# Patient Record
Sex: FEMALE | Race: OTHER | NOT HISPANIC OR LATINO | URBAN - METROPOLITAN AREA
[De-identification: names, ages, dates, MRNs, and addresses within clinical notes are randomized per-mention and may not be internally consistent; named-entity substitution may affect disease eponyms.]

---

## 2017-02-22 ENCOUNTER — OUTPATIENT (OUTPATIENT)
Dept: OUTPATIENT SERVICES | Facility: HOSPITAL | Age: 42
LOS: 1 days | End: 2017-02-22

## 2017-02-22 VITALS
WEIGHT: 110.89 LBS | HEIGHT: 56 IN | HEART RATE: 60 BPM | RESPIRATION RATE: 14 BRPM | DIASTOLIC BLOOD PRESSURE: 70 MMHG | TEMPERATURE: 98 F | SYSTOLIC BLOOD PRESSURE: 110 MMHG

## 2017-02-22 DIAGNOSIS — N63 UNSPECIFIED LUMP IN BREAST: ICD-10-CM

## 2017-02-22 DIAGNOSIS — Z98.890 OTHER SPECIFIED POSTPROCEDURAL STATES: Chronic | ICD-10-CM

## 2017-02-22 DIAGNOSIS — Z98.891 HISTORY OF UTERINE SCAR FROM PREVIOUS SURGERY: Chronic | ICD-10-CM

## 2017-02-22 LAB
HCG SERPL-ACNC: < 5 MIU/ML — SIGNIFICANT CHANGE UP
HCT VFR BLD CALC: 39.3 % — SIGNIFICANT CHANGE UP (ref 34.5–45)
HGB BLD-MCNC: 12.5 G/DL — SIGNIFICANT CHANGE UP (ref 11.5–15.5)
MCHC RBC-ENTMCNC: 27.7 PG — SIGNIFICANT CHANGE UP (ref 27–34)
MCHC RBC-ENTMCNC: 31.8 % — LOW (ref 32–36)
MCV RBC AUTO: 86.9 FL — SIGNIFICANT CHANGE UP (ref 80–100)
PLATELET # BLD AUTO: 294 K/UL — SIGNIFICANT CHANGE UP (ref 150–400)
PMV BLD: 11.3 FL — SIGNIFICANT CHANGE UP (ref 7–13)
RBC # BLD: 4.52 M/UL — SIGNIFICANT CHANGE UP (ref 3.8–5.2)
RBC # FLD: 13.9 % — SIGNIFICANT CHANGE UP (ref 10.3–14.5)
WBC # BLD: 8.51 K/UL — SIGNIFICANT CHANGE UP (ref 3.8–10.5)
WBC # FLD AUTO: 8.51 K/UL — SIGNIFICANT CHANGE UP (ref 3.8–10.5)

## 2017-02-22 NOTE — H&P PST ADULT - NSANTHOSAYNRD_GEN_A_CORE
No. AKANKSHA screening performed.  STOP BANG Legend: 0-2 = LOW Risk; 3-4 = INTERMEDIATE Risk; 5-8 = HIGH Risk

## 2017-02-22 NOTE — H&P PST ADULT - NEGATIVE NEUROLOGICAL SYMPTOMS
no difficulty walking/no generalized seizures/no paresthesias/no syncope/no weakness/no focal seizures/no headache/no tremors
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
## 2017-02-22 NOTE — H&P PST ADULT - PROBLEM SELECTOR PLAN 1
Pt. is scheduled for excision of right breast mass on 2/28/17.  Preoperative instructions reviewed, pt verbalized understanding.  Preop Famotidine and Chlorhexidine provided.  Lab results pending

## 2017-02-22 NOTE — H&P PST ADULT - FAMILY HISTORY
Sibling  Still living? No  Family history of breast cancer, Age at diagnosis: Age Unknown     Mother  Still living? Yes, Estimated age: 71-80  Family history of hypertension, Age at diagnosis: Age Unknown Sibling  Still living? No  Family history of breast cancer, Age at diagnosis: Age Unknown     Mother  Still living? Yes, Estimated age: 71-80  Family history of hypertension, Age at diagnosis: Age Unknown     Grandparent  Still living? No  Family history of breast cancer, Age at diagnosis: Age Unknown

## 2017-02-22 NOTE — H&P PST ADULT - HISTORY OF PRESENT ILLNESS
40 y/o female with a breast mass presents to PAST today for presurgical evaulation.  she noticed the lump 2 years ago and had a biopsy which was benign.  she complained that the lump increased in size.  Mammogram sonogram and bx were done.  Bx was benign , but to have it removed 40 y/o female with a breast lump presents to PAST today for presurgical evaulation.  She has positive family history of breast cancer, maternal grandmother and sister  from breast cancer.  She noticed the right breast lump approximately 2 years ago and had a biopsy which was benign.  Recently she noticed that the lump had grown in size.  Mammogram, sonogram and biopsy was performed.  Biopsy result was benign.  She is scheduled for excision of the right breast mass on 17.
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
## 2017-02-27 ENCOUNTER — TRANSCRIPTION ENCOUNTER (OUTPATIENT)
Age: 42
End: 2017-02-27

## 2017-02-28 ENCOUNTER — OUTPATIENT (OUTPATIENT)
Dept: OUTPATIENT SERVICES | Facility: HOSPITAL | Age: 42
LOS: 1 days | Discharge: HOME | End: 2017-02-28
Payer: COMMERCIAL

## 2017-02-28 VITALS
SYSTOLIC BLOOD PRESSURE: 96 MMHG | HEART RATE: 67 BPM | DIASTOLIC BLOOD PRESSURE: 54 MMHG | TEMPERATURE: 98 F | OXYGEN SATURATION: 100 % | RESPIRATION RATE: 15 BRPM

## 2017-02-28 VITALS
TEMPERATURE: 98 F | HEIGHT: 56 IN | SYSTOLIC BLOOD PRESSURE: 111 MMHG | HEART RATE: 65 BPM | OXYGEN SATURATION: 100 % | DIASTOLIC BLOOD PRESSURE: 68 MMHG | WEIGHT: 110.01 LBS | RESPIRATION RATE: 14 BRPM

## 2017-02-28 DIAGNOSIS — Z98.890 OTHER SPECIFIED POSTPROCEDURAL STATES: Chronic | ICD-10-CM

## 2017-02-28 DIAGNOSIS — N63 UNSPECIFIED LUMP IN BREAST: ICD-10-CM

## 2017-02-28 DIAGNOSIS — Z98.891 HISTORY OF UTERINE SCAR FROM PREVIOUS SURGERY: Chronic | ICD-10-CM

## 2017-02-28 PROCEDURE — 88305 TISSUE EXAM BY PATHOLOGIST: CPT | Mod: 26

## 2017-02-28 RX ORDER — ACETAMINOPHEN 500 MG
2 TABLET ORAL
Qty: 0 | Refills: 0 | COMMUNITY
Start: 2017-02-28

## 2017-02-28 RX ORDER — IBUPROFEN 200 MG
1 TABLET ORAL
Qty: 0 | Refills: 0 | COMMUNITY
Start: 2017-02-28

## 2017-02-28 RX ORDER — SODIUM CHLORIDE 9 MG/ML
1000 INJECTION, SOLUTION INTRAVENOUS
Qty: 0 | Refills: 0 | Status: DISCONTINUED | OUTPATIENT
Start: 2017-02-28 | End: 2017-03-01

## 2017-02-28 RX ORDER — IBUPROFEN 200 MG
400 TABLET ORAL EVERY 6 HOURS
Qty: 0 | Refills: 0 | Status: DISCONTINUED | OUTPATIENT
Start: 2017-02-28 | End: 2017-03-01

## 2017-02-28 RX ORDER — ACETAMINOPHEN 500 MG
650 TABLET ORAL EVERY 6 HOURS
Qty: 0 | Refills: 0 | Status: DISCONTINUED | OUTPATIENT
Start: 2017-02-28 | End: 2017-03-01

## 2017-02-28 NOTE — ASU DISCHARGE PLAN (ADULT/PEDIATRIC). - NOTIFY
Fever greater than 101/Bleeding that does not stop/Pain not relieved by Medications/Persistent Nausea and Vomiting

## 2017-02-28 NOTE — ASU DISCHARGE PLAN (ADULT/PEDIATRIC). - ITEMS TO FOLLOWUP WITH YOUR PHYSICIAN'S
Please follow up with Dr. Dixon in his office in 2-3 weeks.  Please call his office to schedule an appointment.

## 2017-02-28 NOTE — ASU DISCHARGE PLAN (ADULT/PEDIATRIC). - MEDICATION SUMMARY - MEDICATIONS TO TAKE
I will START or STAY ON the medications listed below when I get home from the hospital:    vitamin C 1000 mg orally daily  --     -- Indication: For Home Medication    Vitamin B Complex  1 tablet orally daily  --     -- Indication: For Home Medication    Calcium 600 mg orally daily  --     -- Indication: For Home Medication    ibuprofen 400 mg oral tablet  -- 1 tab(s) by mouth every 6 hours, As needed, moderate pain  -- Indication: For Pain Control    acetaminophen 325 mg oral tablet  -- 2 tab(s) by mouth every 6 hours, As needed, Mild Pain (1 - 3)  -- Indication: For Pain Control

## 2017-03-02 LAB — SURGICAL PATHOLOGY STUDY: SIGNIFICANT CHANGE UP

## 2017-04-06 ENCOUNTER — TRANSCRIPTION ENCOUNTER (OUTPATIENT)
Age: 42
End: 2017-04-06

## 2017-04-06 ENCOUNTER — OUTPATIENT (OUTPATIENT)
Dept: OUTPATIENT SERVICES | Facility: HOSPITAL | Age: 42
LOS: 1 days | End: 2017-04-06

## 2017-04-06 VITALS
WEIGHT: 106.04 LBS | HEART RATE: 79 BPM | DIASTOLIC BLOOD PRESSURE: 82 MMHG | RESPIRATION RATE: 16 BRPM | HEIGHT: 56.5 IN | SYSTOLIC BLOOD PRESSURE: 118 MMHG | TEMPERATURE: 97 F

## 2017-04-06 DIAGNOSIS — Z98.891 HISTORY OF UTERINE SCAR FROM PREVIOUS SURGERY: Chronic | ICD-10-CM

## 2017-04-06 DIAGNOSIS — Z98.890 OTHER SPECIFIED POSTPROCEDURAL STATES: Chronic | ICD-10-CM

## 2017-04-06 DIAGNOSIS — N64.52 NIPPLE DISCHARGE: ICD-10-CM

## 2017-04-06 LAB
HCG SERPL-ACNC: < 5 MIU/ML — SIGNIFICANT CHANGE UP
HCT VFR BLD CALC: 39.2 % — SIGNIFICANT CHANGE UP (ref 34.5–45)
HGB BLD-MCNC: 12.6 G/DL — SIGNIFICANT CHANGE UP (ref 11.5–15.5)
MCHC RBC-ENTMCNC: 27.7 PG — SIGNIFICANT CHANGE UP (ref 27–34)
MCHC RBC-ENTMCNC: 32.1 % — SIGNIFICANT CHANGE UP (ref 32–36)
MCV RBC AUTO: 86.2 FL — SIGNIFICANT CHANGE UP (ref 80–100)
PLATELET # BLD AUTO: 297 K/UL — SIGNIFICANT CHANGE UP (ref 150–400)
PMV BLD: 10.5 FL — SIGNIFICANT CHANGE UP (ref 7–13)
RBC # BLD: 4.55 M/UL — SIGNIFICANT CHANGE UP (ref 3.8–5.2)
RBC # FLD: 14.5 % — SIGNIFICANT CHANGE UP (ref 10.3–14.5)
WBC # BLD: 7.23 K/UL — SIGNIFICANT CHANGE UP (ref 3.8–10.5)
WBC # FLD AUTO: 7.23 K/UL — SIGNIFICANT CHANGE UP (ref 3.8–10.5)

## 2017-04-06 NOTE — H&P PST ADULT - FAMILY HISTORY
Sibling  Still living? No  Family history of breast cancer, Age at diagnosis: Age Unknown     Mother  Still living? Yes, Estimated age: 71-80  Family history of hypertension, Age at diagnosis: Age Unknown     Grandparent  Still living? No  Family history of breast cancer, Age at diagnosis: Age Unknown

## 2017-04-06 NOTE — H&P PST ADULT - PROBLEM SELECTOR PLAN 1
Pt. is scheduled for excision of right breast mass on 2/28/17.  Preoperative instructions reviewed, pt verbalized understanding.  Preop Famotidine and Chlorhexidine provided.  Lab results pending scheduled right breast micro ductotomy possible terminal duct excision right breast on 4/7/17    Preoperative instructions reviewed, pt verbalized understanding.  Preop Famotidine and Chlorhexidine provided.  cbc, hcg results pending  case discussed with Dr Banerjee

## 2017-04-06 NOTE — H&P PST ADULT - NEGATIVE NEUROLOGICAL SYMPTOMS
no difficulty walking/no focal seizures/no weakness/no headache/no tremors/no syncope/no paresthesias/no generalized seizures
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
## 2017-04-06 NOTE — H&P PST ADULT - HISTORY OF PRESENT ILLNESS
40 y/o female with a breast lump presents to PAST today for presurgical evaulation.  She has positive family history of breast cancer, maternal grandmother and sister  from breast cancer.  She noticed the right breast lump approximately 2 years ago and had a biopsy which was benign.  Recently she noticed that the lump had grown in size.  Mammogram, sonogram and biopsy was performed.  Biopsy result was benign.  She is scheduled for excision of the right breast mass on 17. 42 y/o female present to PST today for presurgical evaluation for scheduled right breast micro ductotomy possible terminal duct excision right breast on 17    She has positive family history of breast cancer, maternal grandmother and sister  from breast cancer.  She noticed the right breast lump approximately 2 years ago and had a biopsy which was benign.  Recently she noticed that the lump had grown in size.  Mammogram, sonogram and biopsy was performed.  Biopsy result was benign and is s/p right breast mass on 17.  Per pt two weeks post op had bloody discharge form right nipple, sonogram done and was told need to remove duct in right breast.
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
## 2017-04-06 NOTE — H&P PST ADULT - LYMPHATIC
posterior cervical R/posterior cervical L/anterior cervical L/supraclavicular R/supraclavicular L/anterior cervical R

## 2017-04-07 ENCOUNTER — OUTPATIENT (OUTPATIENT)
Dept: OUTPATIENT SERVICES | Facility: HOSPITAL | Age: 42
LOS: 1 days | Discharge: HOME | End: 2017-04-07
Payer: COMMERCIAL

## 2017-04-07 VITALS
HEIGHT: 56 IN | TEMPERATURE: 98 F | DIASTOLIC BLOOD PRESSURE: 64 MMHG | SYSTOLIC BLOOD PRESSURE: 109 MMHG | WEIGHT: 106.04 LBS | RESPIRATION RATE: 16 BRPM | HEART RATE: 64 BPM

## 2017-04-07 VITALS
OXYGEN SATURATION: 99 % | SYSTOLIC BLOOD PRESSURE: 100 MMHG | HEART RATE: 70 BPM | DIASTOLIC BLOOD PRESSURE: 70 MMHG | RESPIRATION RATE: 12 BRPM

## 2017-04-07 DIAGNOSIS — Z98.891 HISTORY OF UTERINE SCAR FROM PREVIOUS SURGERY: Chronic | ICD-10-CM

## 2017-04-07 DIAGNOSIS — Z98.890 OTHER SPECIFIED POSTPROCEDURAL STATES: Chronic | ICD-10-CM

## 2017-04-07 DIAGNOSIS — N64.52 NIPPLE DISCHARGE: ICD-10-CM

## 2017-04-07 PROCEDURE — 88305 TISSUE EXAM BY PATHOLOGIST: CPT | Mod: 26

## 2017-04-07 RX ORDER — SODIUM CHLORIDE 9 MG/ML
1000 INJECTION, SOLUTION INTRAVENOUS
Qty: 0 | Refills: 0 | Status: DISCONTINUED | OUTPATIENT
Start: 2017-04-07 | End: 2017-04-08

## 2017-04-07 NOTE — ASU DISCHARGE PLAN (ADULT/PEDIATRIC). - NURSING INSTRUCTIONS
You reiceved Tylenol in the OR at 1:00 pm, your next dose of tyelnol ( if needed) will be in 6 hrs at 7:00 pm

## 2017-04-07 NOTE — ASU DISCHARGE PLAN (ADULT/PEDIATRIC). - ACTIVITY LEVEL
No heavy lifting or strenuous activity until follow-up appointment.  Otherwise, you may resume your previous level of activity as tolerated.

## 2017-04-07 NOTE — ASU DISCHARGE PLAN (ADULT/PEDIATRIC). - MEDICATION SUMMARY - MEDICATIONS TO TAKE
I will START or STAY ON the medications listed below when I get home from the hospital:    Percocet 5/325 oral tablet  -- 1 tab(s) by mouth every 6 hours, As Needed for severe pain MDD:4 tabs  -- Caution federal law prohibits the transfer of this drug to any person other  than the person for whom it was prescribed.  May cause drowsiness.  Alcohol may intensify this effect.  Use care when operating dangerous machinery.  This prescription cannot be refilled.  This product contains acetaminophen.  Do not use  with any other product containing acetaminophen to prevent possible liver damage.  Using more of this medication than prescribed may cause serious breathing problems.    -- Indication: For Post-op Pain

## 2017-04-07 NOTE — ASU DISCHARGE PLAN (ADULT/PEDIATRIC). - SPECIAL INSTRUCTIONS
Please follow-up with Dr. Dixon in 1 week.  Please call his office at [phone] to make an appointment.

## 2017-04-07 NOTE — ASU DISCHARGE PLAN (ADULT/PEDIATRIC). - NOTIFY
Persistent Nausea and Vomiting/Swelling that continues/Inability to Tolerate Liquids or Foods/Bleeding that does not stop/Unable to Urinate/Fever greater than 101

## 2017-04-11 DIAGNOSIS — N64.52 NIPPLE DISCHARGE: ICD-10-CM

## 2017-04-14 LAB — SURGICAL PATHOLOGY STUDY: SIGNIFICANT CHANGE UP

## 2020-02-16 NOTE — H&P PST ADULT - NS PRO PASSIVE SMOKE EXP
Mother will breastfeed baby 8 or more times in 24 hours on baby's cue until content. Ensure a deep latch at each feeding that feels like a pull and tug. Latch should not be painful but may be tender. Observe for signs of milk transfer such as audible swallows and chin pauses during feeding. Mother should keep baby active at the breast by using compression of breast and stimulating baby throughout feeding.  Mother should use her feeding log to keep track of baby's intake and output. Mother should avoid bottles and pacifiers. Call for asst as needed.    
Pt to follow basic breastfeeding education.  
Pt to follow basic breastfeeding education.  
No

## 2020-10-19 PROBLEM — N63 UNSPECIFIED LUMP IN BREAST: Chronic | Status: ACTIVE | Noted: 2017-02-22

## 2020-10-19 PROBLEM — D25.9 LEIOMYOMA OF UTERUS, UNSPECIFIED: Chronic | Status: ACTIVE | Noted: 2017-02-22

## 2020-10-19 PROBLEM — T30.0 BURN OF UNSPECIFIED BODY REGION, UNSPECIFIED DEGREE: Chronic | Status: ACTIVE | Noted: 2017-02-22

## 2020-10-19 PROBLEM — S05.91XA UNSPECIFIED INJURY OF RIGHT EYE AND ORBIT, INITIAL ENCOUNTER: Chronic | Status: ACTIVE | Noted: 2017-02-22

## 2020-10-22 ENCOUNTER — APPOINTMENT (OUTPATIENT)
Dept: OBGYN | Facility: CLINIC | Age: 45
End: 2020-10-22

## 2020-10-23 ENCOUNTER — RESULT REVIEW (OUTPATIENT)
Age: 45
End: 2020-10-23

## 2020-10-23 ENCOUNTER — APPOINTMENT (OUTPATIENT)
Dept: OBGYN | Facility: CLINIC | Age: 45
End: 2020-10-23
Payer: COMMERCIAL

## 2020-10-23 PROCEDURE — 99386 PREV VISIT NEW AGE 40-64: CPT

## 2020-10-23 PROCEDURE — 99072 ADDL SUPL MATRL&STAF TM PHE: CPT

## 2020-12-08 NOTE — H&P PST ADULT - ALCOHOL USE HISTORY SINGLE SELECT
[FreeTextEntry1] : Ms Jasso is a 64 year old female with PMHx AT s/p failed ablation (not performed in 2015), positive tilt table/orthostatic hypotension, VSD, non-obstructive CAD, PVCs, chronic palpitations, renal donor (s/p L nephrectomy for son) admitted with palpitations rom PST, and SOB at rest.\par -CTA chest with extensive pulmonary embolism. Suggestion right heart strain. Right upper lobe 6 mm nodule is indeterminate.\par LE duplex Acute, non-occlusive deep vein thromboses noted in the right peroneal and posterior tibial veins.\par \par -S/p hep gtt. Started on Xarelto \par -echo with Dilated RV and Decreased RV function, RV normal on MRI on 9/23/2020 \par \par Patient complaints of low back pain, not taking pain meds, pain likely immobility. Denies fevers, night sweats or weight loss. \par \par Referred for hypercoagulable work up. \par \par I had a detailed discussion today with the patient regarding the natural history, epidemiology, diagnosis, and treatment of  hypercoagulable state. I reviewed her radiological studies in detail today. I then discussed treatment with Xarelto 20 mg daily for at least 1 year.  Hypercoagulable work up was done to determine length of anticoagulation. I reviewed with patient the benefits versus risks of therapy. I answered all her  questions to satisfaction.\par \par Greater than 50% of the encounter time was spent on counseling and coordination of care for  hypercoagulable state    and I have spent  45   minutes of face to face time with the patient.\par \par RTC 4 months. \par 
never

## 2021-01-25 PROBLEM — Z00.00 ENCOUNTER FOR PREVENTIVE HEALTH EXAMINATION: Status: ACTIVE | Noted: 2021-01-25

## 2021-10-28 ENCOUNTER — APPOINTMENT (OUTPATIENT)
Dept: OBGYN | Facility: CLINIC | Age: 46
End: 2021-10-28
Payer: COMMERCIAL

## 2021-10-28 VITALS — DIASTOLIC BLOOD PRESSURE: 85 MMHG | SYSTOLIC BLOOD PRESSURE: 125 MMHG | WEIGHT: 119 LBS

## 2021-10-28 DIAGNOSIS — Z80.3 FAMILY HISTORY OF MALIGNANT NEOPLASM OF BREAST: ICD-10-CM

## 2021-10-28 DIAGNOSIS — Z82.49 FAMILY HISTORY OF ISCHEMIC HEART DISEASE AND OTHER DISEASES OF THE CIRCULATORY SYSTEM: ICD-10-CM

## 2021-10-28 DIAGNOSIS — Z86.018 PERSONAL HISTORY OF OTHER BENIGN NEOPLASM: ICD-10-CM

## 2021-10-28 DIAGNOSIS — Z86.39 PERSONAL HISTORY OF OTHER ENDOCRINE, NUTRITIONAL AND METABOLIC DISEASE: ICD-10-CM

## 2021-10-28 DIAGNOSIS — R03.0 ELEVATED BLOOD-PRESSURE READING, W/OUT DIAGNOSIS OF HYPERTENSION: ICD-10-CM

## 2021-10-28 DIAGNOSIS — Z80.49 FAMILY HISTORY OF MALIGNANT NEOPLASM OF OTHER GENITAL ORGANS: ICD-10-CM

## 2021-10-28 PROCEDURE — 99396 PREV VISIT EST AGE 40-64: CPT

## 2021-11-01 LAB
C TRACH RRNA SPEC QL NAA+PROBE: NOT DETECTED
HPV 16 E6+E7 MRNA CVX QL NAA+PROBE: NOT DETECTED
HPV HIGH+LOW RISK DNA PNL CVX: NOT DETECTED
HPV18+45 E6+E7 MRNA CVX QL NAA+PROBE: NOT DETECTED
N GONORRHOEA RRNA SPEC QL NAA+PROBE: NOT DETECTED
SOURCE AMPLIFICATION: NORMAL

## 2021-11-04 LAB — CYTOLOGY CVX/VAG DOC THIN PREP: NORMAL

## 2021-11-11 ENCOUNTER — APPOINTMENT (OUTPATIENT)
Dept: ANTEPARTUM | Facility: CLINIC | Age: 46
End: 2021-11-11
Payer: COMMERCIAL

## 2021-11-11 ENCOUNTER — APPOINTMENT (OUTPATIENT)
Dept: OBGYN | Facility: CLINIC | Age: 46
End: 2021-11-11
Payer: COMMERCIAL

## 2021-11-11 VITALS — SYSTOLIC BLOOD PRESSURE: 132 MMHG | DIASTOLIC BLOOD PRESSURE: 81 MMHG | WEIGHT: 120 LBS

## 2021-11-11 DIAGNOSIS — N84.0 POLYP OF CORPUS UTERI: ICD-10-CM

## 2021-11-11 PROCEDURE — 76857 US EXAM PELVIC LIMITED: CPT

## 2021-11-11 PROCEDURE — ZZZZZ: CPT

## 2021-11-11 PROCEDURE — 76830 TRANSVAGINAL US NON-OB: CPT

## 2022-01-06 ENCOUNTER — APPOINTMENT (OUTPATIENT)
Dept: OBGYN | Facility: CLINIC | Age: 47
End: 2022-01-06

## 2022-01-06 ENCOUNTER — APPOINTMENT (OUTPATIENT)
Dept: ANTEPARTUM | Facility: CLINIC | Age: 47
End: 2022-01-06

## 2022-01-30 NOTE — COUNSELING
[Breast Self Exam] : breast self exam [Contraception/ Emergency Contraception/ Safe Sexual Practices] : contraception, emergency contraception, safe sexual practices [Vaccines] : vaccines

## 2022-01-30 NOTE — PLAN
[FreeTextEntry1] : 46 year old P1 presenting for annual exam.\par -uterus is irregular, about 16-18 weeks by size\par -unofficial ultrasound today consistent with many large fibroids\par -patient with normal menstrual bleeding which is reassuring\par -given patient is close to menopause, anticipate these fibroids will soon shrink in size\par -however if they start to grow rapidly, cause AUB, or cause the patient pain, plan for hysterectomy\par -f/u PAP and GC/CT done today\par -contraception: condoms\par -f/u for official GYN ultrasound, and then repeat GYN ultrasound in another 6 months\par \par =============================================\par I, Jany Betancourt, acted solely as a scribe for Dr. Ward Augustin on Oct 28 2021  2:00PM. All medical entries made by the Scribe were at my, Dr. Ward Augustin's, direction and personally dictated by me on Oct 28 2021  2:00PM . I have reviewed the chart and agree that the record accurately reflects my personal performance of the history, physical exam, assessment, and plan. I have also personally directed, reviewed, and agreed with the chart.\par =============================================

## 2022-01-30 NOTE — PHYSICAL EXAM
[Appropriately responsive] : appropriately responsive [Alert] : alert [No Acute Distress] : no acute distress [No Lymphadenopathy] : no lymphadenopathy [Regular Rate Rhythm] : regular rate rhythm [No Murmurs] : no murmurs [Clear to Auscultation B/L] : clear to auscultation bilaterally [Soft] : soft [Non-tender] : non-tender [Non-distended] : non-distended [No HSM] : No HSM [No Lesions] : no lesions [No Mass] : no mass [Oriented x3] : oriented x3 [Examination Of The Breasts] : a normal appearance [No Masses] : no breast masses were palpable [Labia Majora] : normal [Labia Minora] : normal [Normal] : normal [Enlarged ___ wks] : enlarged [unfilled] ~Uweeks [Uterine Adnexae] : normal [Declined] : Patient declined rectal exam [FreeTextEntry6] : Irregular

## 2022-01-30 NOTE — HISTORY OF PRESENT ILLNESS
[N] : Patient reports normal menses [Y] : Positive pregnancy history [Patient reported PAP Smear was normal] : Patient reported PAP Smear was normal [Gonorrhea test offered] : Gonorrhea test offered [Chlamydia test offered] : Chlamydia test offered [HPV test offered] : HPV test offered [Monogamous] : is monogamous [Currently Active] : currently active [Men] : men [No] : No [Yes] : Yes [Condoms] : Condoms [Patient refuses STI testing] : Patient refuses STI testing [Mammogramdate] : 10/21 [TextBox_19] : patient had mammogram today, results pending [PapSmeardate] : 12/20 [LMPDate] : 10/22/21 [MensesFreq] : 28 [MensesAmount] : normal [PGxTotal] : 1 [[facility] Center[facility]] : 1 [PGHxPremature] : 0 [PGHxAbortions] : 0 [[facility] Healthcareiving] : 1 [FreeTextEntry1] :  x 1

## 2022-06-23 ENCOUNTER — APPOINTMENT (OUTPATIENT)
Dept: OBGYN | Facility: CLINIC | Age: 47
End: 2022-06-23

## 2022-06-23 ENCOUNTER — APPOINTMENT (OUTPATIENT)
Dept: ANTEPARTUM | Facility: CLINIC | Age: 47
End: 2022-06-23

## 2022-06-23 VITALS — WEIGHT: 118 LBS | DIASTOLIC BLOOD PRESSURE: 88 MMHG | SYSTOLIC BLOOD PRESSURE: 133 MMHG

## 2022-06-27 ENCOUNTER — APPOINTMENT (OUTPATIENT)
Dept: OBGYN | Facility: CLINIC | Age: 47
End: 2022-06-27

## 2022-06-27 ENCOUNTER — APPOINTMENT (OUTPATIENT)
Dept: ANTEPARTUM | Facility: CLINIC | Age: 47
End: 2022-06-27

## 2022-06-27 VITALS
DIASTOLIC BLOOD PRESSURE: 85 MMHG | WEIGHT: 117 LBS | SYSTOLIC BLOOD PRESSURE: 121 MMHG | HEIGHT: 60 IN | BODY MASS INDEX: 22.97 KG/M2

## 2022-06-27 PROCEDURE — 99242 OFF/OP CONSLTJ NEW/EST SF 20: CPT

## 2022-06-27 NOTE — PHYSICAL EXAM

## 2022-06-27 NOTE — PLAN
[FreeTextEntry1] : -Discussed with patient she can choose to go for a second opinion, if not she should speak to  and start chemo therapy as soon as possible. \par -RTO October for annual

## 2022-06-27 NOTE — HISTORY OF PRESENT ILLNESS
[Patient reported PAP Smear was normal] : Patient reported PAP Smear was normal [FreeTextEntry1] : Patient is a 46 year old  presenting for a consultation. LMP 6/5/22. She was dx with breast cancer after going for routine mammo, MRI done on 6/7/22. She had a biopsy done as she had a lump which she thought was non-benign. She sees surgical oncologist Dr. Han and . She has a fam hx of breast cancer. She was also told the cancer has spread to her lymph nodes. She states she will start chemo therapy as soon as possible. \par \par On exam today, uterus is enlarged measuring 14 weeks. Multiple myomas noted as last time measuring 5cm.  [PapSmeardate] : 10/28/21

## 2022-06-28 ENCOUNTER — OUTPATIENT (OUTPATIENT)
Dept: OUTPATIENT SERVICES | Facility: HOSPITAL | Age: 47
LOS: 1 days | End: 2022-06-28

## 2022-06-28 VITALS
SYSTOLIC BLOOD PRESSURE: 123 MMHG | RESPIRATION RATE: 16 BRPM | HEIGHT: 57 IN | DIASTOLIC BLOOD PRESSURE: 84 MMHG | HEART RATE: 78 BPM | TEMPERATURE: 98 F | WEIGHT: 115.08 LBS | OXYGEN SATURATION: 98 %

## 2022-06-28 DIAGNOSIS — E03.9 HYPOTHYROIDISM, UNSPECIFIED: ICD-10-CM

## 2022-06-28 DIAGNOSIS — I10 ESSENTIAL (PRIMARY) HYPERTENSION: ICD-10-CM

## 2022-06-28 DIAGNOSIS — Z98.890 OTHER SPECIFIED POSTPROCEDURAL STATES: Chronic | ICD-10-CM

## 2022-06-28 DIAGNOSIS — C50.911 MALIGNANT NEOPLASM OF UNSPECIFIED SITE OF RIGHT FEMALE BREAST: ICD-10-CM

## 2022-06-28 DIAGNOSIS — Z98.891 HISTORY OF UTERINE SCAR FROM PREVIOUS SURGERY: Chronic | ICD-10-CM

## 2022-06-28 LAB
ALBUMIN SERPL ELPH-MCNC: 3.9 G/DL — SIGNIFICANT CHANGE UP (ref 3.3–5)
ALP SERPL-CCNC: 47 U/L — SIGNIFICANT CHANGE UP (ref 40–120)
ALT FLD-CCNC: 17 U/L — SIGNIFICANT CHANGE UP (ref 4–33)
ANION GAP SERPL CALC-SCNC: 10 MMOL/L — SIGNIFICANT CHANGE UP (ref 7–14)
AST SERPL-CCNC: 20 U/L — SIGNIFICANT CHANGE UP (ref 4–32)
BILIRUB SERPL-MCNC: 0.3 MG/DL — SIGNIFICANT CHANGE UP (ref 0.2–1.2)
BUN SERPL-MCNC: 13 MG/DL — SIGNIFICANT CHANGE UP (ref 7–23)
CALCIUM SERPL-MCNC: 9.3 MG/DL — SIGNIFICANT CHANGE UP (ref 8.4–10.5)
CHLORIDE SERPL-SCNC: 103 MMOL/L — SIGNIFICANT CHANGE UP (ref 98–107)
CO2 SERPL-SCNC: 26 MMOL/L — SIGNIFICANT CHANGE UP (ref 22–31)
CREAT SERPL-MCNC: 0.82 MG/DL — SIGNIFICANT CHANGE UP (ref 0.5–1.3)
EGFR: 88 ML/MIN/1.73M2 — SIGNIFICANT CHANGE UP
GLUCOSE SERPL-MCNC: 109 MG/DL — HIGH (ref 70–99)
HCG UR QL: NEGATIVE — SIGNIFICANT CHANGE UP
HCT VFR BLD CALC: 38.1 % — SIGNIFICANT CHANGE UP (ref 34.5–45)
HGB BLD-MCNC: 12.7 G/DL — SIGNIFICANT CHANGE UP (ref 11.5–15.5)
MCHC RBC-ENTMCNC: 29.4 PG — SIGNIFICANT CHANGE UP (ref 27–34)
MCHC RBC-ENTMCNC: 33.3 GM/DL — SIGNIFICANT CHANGE UP (ref 32–36)
MCV RBC AUTO: 88.2 FL — SIGNIFICANT CHANGE UP (ref 80–100)
NRBC # BLD: 0 /100 WBCS — SIGNIFICANT CHANGE UP
NRBC # FLD: 0 K/UL — SIGNIFICANT CHANGE UP
PLATELET # BLD AUTO: 284 K/UL — SIGNIFICANT CHANGE UP (ref 150–400)
POTASSIUM SERPL-MCNC: 4.2 MMOL/L — SIGNIFICANT CHANGE UP (ref 3.5–5.3)
POTASSIUM SERPL-SCNC: 4.2 MMOL/L — SIGNIFICANT CHANGE UP (ref 3.5–5.3)
PROT SERPL-MCNC: 7 G/DL — SIGNIFICANT CHANGE UP (ref 6–8.3)
RBC # BLD: 4.32 M/UL — SIGNIFICANT CHANGE UP (ref 3.8–5.2)
RBC # FLD: 12.6 % — SIGNIFICANT CHANGE UP (ref 10.3–14.5)
SODIUM SERPL-SCNC: 139 MMOL/L — SIGNIFICANT CHANGE UP (ref 135–145)
WBC # BLD: 9.93 K/UL — SIGNIFICANT CHANGE UP (ref 3.8–10.5)
WBC # FLD AUTO: 9.93 K/UL — SIGNIFICANT CHANGE UP (ref 3.8–10.5)

## 2022-06-28 PROCEDURE — 93010 ELECTROCARDIOGRAM REPORT: CPT

## 2022-06-28 RX ORDER — SODIUM CHLORIDE 9 MG/ML
1000 INJECTION, SOLUTION INTRAVENOUS
Refills: 0 | Status: DISCONTINUED | OUTPATIENT
Start: 2022-06-30 | End: 2022-07-14

## 2022-06-28 NOTE — H&P PST ADULT - HISTORY OF PRESENT ILLNESS
This is a 46 y.o. female with malignant neoplasm unspecified site right female breast . Pt is scheduled for insertion of infusaport 6/30/22.

## 2022-06-28 NOTE — H&P PST ADULT - PROBLEM SELECTOR PLAN 1
Scheduled for insertion of infusaport  Preop instructions provided and patient verbalizes understanding.  Labs done and results pending. Hibiclens provided with instructions and was signed by patient. Teach-back method was utilized to assess patient's understanding. Patient verbalized understanding.
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
## 2022-06-28 NOTE — H&P PST ADULT - NEGATIVE OPHTHALMOLOGIC SYMPTOMS
no diplopia/no blurred vision L/no blurred vision R wears glasses/no diplopia/no blurred vision L/no blurred vision R

## 2022-06-28 NOTE — H&P PST ADULT - NSICDXFAMILYHX_GEN_ALL_CORE_FT
FAMILY HISTORY:  Mother  Still living? Yes, Estimated age: 71-80  Family history of hypertension, Age at diagnosis: Age Unknown    Sibling  Still living? No  Family history of breast cancer, Age at diagnosis: Age Unknown    Grandparent  Still living? No  Family history of breast cancer, Age at diagnosis: Age Unknown

## 2022-06-28 NOTE — H&P PST ADULT - PROBLEM SELECTOR PLAN 2
pt instructed to take levothyroxine day of surgery . Medical clearance as per Dr Dixon, requesting report . Pt had TFT within the last 2 weeks .

## 2022-06-29 ENCOUNTER — TRANSCRIPTION ENCOUNTER (OUTPATIENT)
Age: 47
End: 2022-06-29
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
## 2022-06-29 NOTE — ASU PATIENT PROFILE, ADULT - HEALTH/HEALTHCARE ANXIETIES, PROFILE
ED HPI GENERAL MEDICAL PROBLEM





- General


Chief Complaint: Abdominal Pain


Stated Complaint: STOMACH PAIN


Time Seen by Provider: 11/30/19 02:53





- History of Present Illness


INITIAL COMMENTS - FREE TEXT/NARRATIVE: 


PEDS HISTORY AND PHYSICAL:





History of present illness:


Patient 7-year-old female no significant past medical history is obtained 

immunizations presents with concern of abdominal pain 2 hours there's been no 

vomiting no diarrhea no urinary symptoms no other complaints is been no fever 

no chills. His pain shortness breath or other concerns





Review of systems: 


As per history of present illness and below otherwise all systems reviewed and 

negative.





Past medical history: 


As per history of present illness and as reviewed below otherwise 

noncontributory.





Surgical history: 


As per history of present illness and as reviewed below otherwise 

noncontributory.





Social history: 


No reported history of drug or alcohol abuse.





Family history: 


As per history of present illness and as reviewed below otherwise 

noncontributory.





Physical exam:


HEENT: Atraumatic, normocephalic, pupils reactive, negative for conjunctival 

pallor or scleral icterus, mucous membranes moist, throat clear, neck supple, 

nontender, trachea midline.  TMs normal bilaterally, no cervical adenopathy or 

nuchal rigidity.  


Lungs: Clear to auscultation, breath sounds equal bilaterally, chest nontender.


Heart: S1S2, regular rate and rhythm, no overt murmurs


Abdomen: Soft, nondistended, no localized tenderness. Negative for masses or 

hepatosplenomegaly. Normal abdominal bowel sounds.  


Pelvis: Stable nontender.


Genitourinary: Deferred.


Rectal: Deferred.


Extremities: Atraumatic, full range of motion without defects or deficits. 

Neurovascular unremarkable.


Neuro: Awake, alert, and age appropriate non focal non toxic exam


Skin:  Normal turgor, no overt rash or lesions


Diagnostics:


CBC CMP UA KUB





Therapeutics:


None





Impression: 


#1 abdominal pain


  








Definitive disposition and diagnosis as appropriate pending reevaluation and 

review of above.











  ** Mid abdominal


Pain Score (Numeric/FACES): 9





- Related Data


 Allergies











Allergy/AdvReac Type Severity Reaction Status Date / Time


 


Penicillins Allergy  Hives Verified 11/30/19 02:48











Home Meds: 


 Home Meds





Multivitamins [Tab-A-Braulio] 1 tab PO DAILY 10/09/16 [History]











Past Medical History


Cardiovascular History: Reports: None


Respiratory History: Reports: None


Gastrointestinal History: Reports: None


Genitourinary History: Reports: None


Musculoskeletal History: Reports: None


Neurological History: Reports: None


Psychiatric History: Reports: None


Endocrine/Metabolic History: Reports: None


Hematologic History: Reports: None


Immunologic History: Reports: None


Oncologic (Cancer) History: Reports: None


Dermatologic History: Reports: None





- Infectious Disease History


Infectious Disease History: Reports: None





- Past Surgical History


Head Surgeries/Procedures: Reports: None


HEENT Surgical History: Reports: Eye Surgery


Cardiovascular Surgical History: Reports: None


GI Surgical History: Reports: None


Female  Surgical History: Reports: None





Social & Family History





- Family History


Respiratory: Reports: Asthma





- Tobacco Use


Second Hand Smoke Exposure: No





- Caffeine Use


Caffeine Use: Reports: None





ED ROS GENERAL





- Review of Systems


Review Of Systems: Comprehensive ROS is negative, except as noted in HPI.





ED EXAM, GENERAL





- Physical Exam


Exam: See Below (See dictation)





Course





- Vital Signs


Last Recorded V/S: 


 Last Vital Signs











Temp  35.9 C L  11/30/19 02:44


 


Pulse  91   11/30/19 02:44


 


Resp  18   11/30/19 02:44


 


BP  119/67   11/30/19 02:44


 


Pulse Ox  98   11/30/19 02:44














- Orders/Labs/Meds


Orders: 


 Active Orders 24 hr











 Category Date Time Status


 


 CBC W/O DIFF,HEMOGRAM [HEME] Stat Lab  11/30/19 02:49 Ordered


 


 COMPREHENSIVE METABOLIC PN,CMP [CHEM] Stat Lab  11/30/19 02:49 Ordered











Labs: 


 Laboratory Tests











  11/30/19 Range/Units





  03:00 


 


Urine Color  YELLOW  


 


Urine Appearance  CLOUDY  


 


Urine pH  6.0  (5.0-8.0)  


 


Ur Specific Gravity  >= 1.030  (1.001-1.035)  


 


Urine Protein  NEGATIVE  (NEGATIVE)  mg/dL


 


Urine Glucose (UA)  NEGATIVE  (NEGATIVE)  mg/dL


 


Urine Ketones  NEGATIVE  (NEGATIVE)  mg/dL


 


Urine Occult Blood  NEGATIVE  (NEGATIVE)  


 


Urine Nitrite  NEGATIVE  (NEGATIVE)  


 


Urine Bilirubin  NEGATIVE  (NEGATIVE)  


 


Urine Urobilinogen  0.2  (<2.0)  EU/dL


 


Ur Leukocyte Esterase  NEGATIVE  (NEGATIVE)  














Departure





- Departure


Time of Disposition: 02:56


Disposition: Home, Self-Care 01


Condition: Good


Clinical Impression: 


Abdominal pain


Qualifiers:


 Abdominal location: epigastric Qualified Code(s): R10.13 - Epigastric pain








- Discharge Information


Referrals: 


Homero Gonzalez MD [Primary Care Provider] - 


Forms:  ED Department Discharge


Additional Instructions: 


The following information is given to patients seen in the emergency department 

who are being discharged to home. This information is to outline your options 

for follow-up care. We provide all patients seen in our emergency department 

with a follow-up referral.





The need for follow-up, as well as the timing and circumstances, are variable 

depending upon the specifics of your emergency department visit.





If you don't have a primary care physician on staff, we will provide you with a 

referral. We always advise you to contact your personal physician following an 

emergency department visit to inform them of the circumstance of the visit and 

for follow-up with them and/or the need for any referrals to a consulting 

specialist.





The emergency department will also refer you to a specialist when appropriate. 

This referral assures that you have the opportunity for followup care with a 

specialist. All of these measure are taken in an effort to provide you with 

optimal care, which includes your followup.





Under all circumstances we always encourage you to contact your private 

physician who remains a resource for coordinating  your care. When calling for 

followup care, please make the office aware that this follow-up is from your 

recent emergency room visit. If for any reason you are refused follow-up, 

please contact the [facility] emergency department at [phone] 

and asked to speak to the emergency department charge nurse.














Push fluids clear liquids as discussed follow-up primary medical doctor as 

discussed return as needed as discussed





- My Orders


Last 24 Hours: 


My Active Orders





11/30/19 02:49


CBC W/O DIFF,HEMOGRAM [HEME] Stat 


COMPREHENSIVE METABOLIC PN,CMP [CHEM] Stat 














- Assessment/Plan


Last 24 Hours: 


My Active Orders





11/30/19 02:49


CBC W/O DIFF,HEMOGRAM [HEME] Stat 


COMPREHENSIVE METABOLIC PN,CMP [CHEM] Stat
pre op anxiety

## 2022-06-29 NOTE — ASU PATIENT PROFILE, ADULT - FALL HARM RISK - UNIVERSAL INTERVENTIONS
Bed in lowest position, wheels locked, appropriate side rails in place/Call bell, personal items and telephone in reach/Instruct patient to call for assistance before getting out of bed or chair/Non-slip footwear when patient is out of bed/[place] to call system/Physically safe environment - no spills, clutter or unnecessary equipment/Purposeful Proactive Rounding/Room/bathroom lighting operational, light cord in reach

## 2022-06-30 ENCOUNTER — OUTPATIENT (OUTPATIENT)
Dept: OUTPATIENT SERVICES | Facility: HOSPITAL | Age: 47
LOS: 1 days | Discharge: HOME | End: 2022-06-30

## 2022-06-30 ENCOUNTER — TRANSCRIPTION ENCOUNTER (OUTPATIENT)
Age: 47
End: 2022-06-30

## 2022-06-30 VITALS
OXYGEN SATURATION: 97 % | RESPIRATION RATE: 16 BRPM | HEART RATE: 70 BPM | DIASTOLIC BLOOD PRESSURE: 68 MMHG | SYSTOLIC BLOOD PRESSURE: 104 MMHG

## 2022-06-30 VITALS
HEIGHT: 57 IN | TEMPERATURE: 98 F | HEART RATE: 70 BPM | RESPIRATION RATE: 18 BRPM | DIASTOLIC BLOOD PRESSURE: 78 MMHG | SYSTOLIC BLOOD PRESSURE: 111 MMHG | WEIGHT: 115.08 LBS | OXYGEN SATURATION: 98 %

## 2022-06-30 DIAGNOSIS — Z98.890 OTHER SPECIFIED POSTPROCEDURAL STATES: Chronic | ICD-10-CM

## 2022-06-30 DIAGNOSIS — C50.911 MALIGNANT NEOPLASM OF UNSPECIFIED SITE OF RIGHT FEMALE BREAST: ICD-10-CM

## 2022-06-30 DIAGNOSIS — Z98.891 HISTORY OF UTERINE SCAR FROM PREVIOUS SURGERY: Chronic | ICD-10-CM

## 2022-06-30 LAB
HCG UR QL: NEGATIVE — SIGNIFICANT CHANGE UP
SARS-COV-2 RNA SPEC QL NAA+PROBE: SIGNIFICANT CHANGE UP

## 2022-06-30 DEVICE — PORT POWER ISP MRI 9.6SI: Type: IMPLANTABLE DEVICE | Status: FUNCTIONAL

## 2022-06-30 NOTE — ASU DISCHARGE PLAN (ADULT/PEDIATRIC) - NS MD DC FALL RISK RISK
For information on Fall & Injury Prevention, visit: [email or web address] OR  [email or web address] OR  https://www.cdc.gov/steadi/patient.html

## 2022-06-30 NOTE — BRIEF OPERATIVE NOTE - OPERATION/FINDINGS
Cut down on R cephalic vein, insertion Infusaport, placement confirmed by intra operative fluoroscopy

## 2022-06-30 NOTE — ASU DISCHARGE PLAN (ADULT/PEDIATRIC) - ASU DC SPECIAL INSTRUCTIONSFT
PAIN CONTROL: Please take tylenol and motrin for pain control. You may take each every 6 hours, alternating the two every 3 hours. For example, Tylenol at 9am, Motrin at 12pm, Tylenol at 3pm, etc.     WOUND CARE:  Please keep incisions clean and dry. Please do not Scrub or rub incisions. Do not use lotion or powder on incisions. You have steri strip over your incision. Please do not remove these, they will fall off on their own.    BATHING: You may shower and/or sponge bathe starting tomorrow. Please do not submerge wound underwater. You may use warm soapy water in the shower and rinse, pat dry.    ACTIVITY: No heavy lifting or straining. Otherwise, you may return to your usual level of physical activity. If you are taking narcotic pain medication DO NOT drive a car, operate machinery or make important decisions.    DIET: Return to your usual diet.    NOTIFY YOUR SURGEON IF YOU HAVE: any bleeding that does not stop, any pus draining from your wound(s), any fever (over 100.4 F) persistent nausea/vomiting, or if your pain is not controlled on your discharge pain medications, unable to urinate.    Please follow up with your primary care physician in one week regarding your hospitalization, bring copies of your discharge paperwork.    Please follow-up with your surgeon, within 1-2 weeks following discharge- please call to schedule an appointment.

## 2022-06-30 NOTE — ASU DISCHARGE PLAN (ADULT/PEDIATRIC) - CARE PROVIDER_API CALL
Celeste Dixon)  Surgery  [address], [address]  [place] [address]  Phone: [phone]  Fax: [phone]  Follow Up Time: 2 weeks

## 2022-07-01 ENCOUNTER — EMERGENCY (EMERGENCY)
Facility: HOSPITAL | Age: 47
LOS: 1 days | Discharge: HOME | End: 2022-07-01
Attending: EMERGENCY MEDICINE | Admitting: EMERGENCY MEDICINE

## 2022-07-01 VITALS
HEART RATE: 77 BPM | RESPIRATION RATE: 18 BRPM | DIASTOLIC BLOOD PRESSURE: 76 MMHG | OXYGEN SATURATION: 100 % | TEMPERATURE: 98 F | HEIGHT: 57 IN | SYSTOLIC BLOOD PRESSURE: 116 MMHG

## 2022-07-01 VITALS
DIASTOLIC BLOOD PRESSURE: 82 MMHG | TEMPERATURE: 98 F | SYSTOLIC BLOOD PRESSURE: 112 MMHG | OXYGEN SATURATION: 98 % | RESPIRATION RATE: 20 BRPM | HEART RATE: 64 BPM

## 2022-07-01 DIAGNOSIS — Z98.890 OTHER SPECIFIED POSTPROCEDURAL STATES: Chronic | ICD-10-CM

## 2022-07-01 DIAGNOSIS — Z98.891 HISTORY OF UTERINE SCAR FROM PREVIOUS SURGERY: Chronic | ICD-10-CM

## 2022-07-01 PROBLEM — E03.9 HYPOTHYROIDISM, UNSPECIFIED: Chronic | Status: ACTIVE | Noted: 2022-06-28

## 2022-07-01 PROBLEM — I10 ESSENTIAL (PRIMARY) HYPERTENSION: Chronic | Status: ACTIVE | Noted: 2022-06-28

## 2022-07-01 LAB
ALBUMIN SERPL ELPH-MCNC: 3.9 G/DL — SIGNIFICANT CHANGE UP (ref 3.3–5)
ALP SERPL-CCNC: 50 U/L — SIGNIFICANT CHANGE UP (ref 40–120)
ALT FLD-CCNC: 13 U/L — SIGNIFICANT CHANGE UP (ref 4–33)
ANION GAP SERPL CALC-SCNC: 11 MMOL/L — SIGNIFICANT CHANGE UP (ref 7–14)
AST SERPL-CCNC: 24 U/L — SIGNIFICANT CHANGE UP (ref 4–32)
BASOPHILS # BLD AUTO: 0.03 K/UL — SIGNIFICANT CHANGE UP (ref 0–0.2)
BASOPHILS NFR BLD AUTO: 0.3 % — SIGNIFICANT CHANGE UP (ref 0–2)
BILIRUB SERPL-MCNC: 0.2 MG/DL — SIGNIFICANT CHANGE UP (ref 0.2–1.2)
BUN SERPL-MCNC: 9 MG/DL — SIGNIFICANT CHANGE UP (ref 7–23)
CALCIUM SERPL-MCNC: 8.8 MG/DL — SIGNIFICANT CHANGE UP (ref 8.4–10.5)
CHLORIDE SERPL-SCNC: 103 MMOL/L — SIGNIFICANT CHANGE UP (ref 98–107)
CO2 SERPL-SCNC: 23 MMOL/L — SIGNIFICANT CHANGE UP (ref 22–31)
CREAT SERPL-MCNC: 0.78 MG/DL — SIGNIFICANT CHANGE UP (ref 0.5–1.3)
EGFR: 94 ML/MIN/1.73M2 — SIGNIFICANT CHANGE UP
EOSINOPHIL # BLD AUTO: 0.13 K/UL — SIGNIFICANT CHANGE UP (ref 0–0.5)
EOSINOPHIL NFR BLD AUTO: 1.2 % — SIGNIFICANT CHANGE UP (ref 0–6)
GLUCOSE SERPL-MCNC: 105 MG/DL — HIGH (ref 70–99)
HCG SERPL-ACNC: <5 MIU/ML — SIGNIFICANT CHANGE UP
HCT VFR BLD CALC: 37.9 % — SIGNIFICANT CHANGE UP (ref 34.5–45)
HGB BLD-MCNC: 12.6 G/DL — SIGNIFICANT CHANGE UP (ref 11.5–15.5)
IANC: 8.58 K/UL — HIGH (ref 1.8–7.4)
IMM GRANULOCYTES NFR BLD AUTO: 0.3 % — SIGNIFICANT CHANGE UP (ref 0–1.5)
LYMPHOCYTES # BLD AUTO: 1.28 K/UL — SIGNIFICANT CHANGE UP (ref 1–3.3)
LYMPHOCYTES # BLD AUTO: 12 % — LOW (ref 13–44)
MCHC RBC-ENTMCNC: 29.3 PG — SIGNIFICANT CHANGE UP (ref 27–34)
MCHC RBC-ENTMCNC: 33.2 GM/DL — SIGNIFICANT CHANGE UP (ref 32–36)
MCV RBC AUTO: 88.1 FL — SIGNIFICANT CHANGE UP (ref 80–100)
MONOCYTES # BLD AUTO: 0.63 K/UL — SIGNIFICANT CHANGE UP (ref 0–0.9)
MONOCYTES NFR BLD AUTO: 5.9 % — SIGNIFICANT CHANGE UP (ref 2–14)
NEUTROPHILS # BLD AUTO: 8.58 K/UL — HIGH (ref 1.8–7.4)
NEUTROPHILS NFR BLD AUTO: 80.3 % — HIGH (ref 43–77)
NRBC # BLD: 0 /100 WBCS — SIGNIFICANT CHANGE UP
NRBC # FLD: 0 K/UL — SIGNIFICANT CHANGE UP
PLATELET # BLD AUTO: 282 K/UL — SIGNIFICANT CHANGE UP (ref 150–400)
POTASSIUM SERPL-MCNC: 3.9 MMOL/L — SIGNIFICANT CHANGE UP (ref 3.5–5.3)
POTASSIUM SERPL-SCNC: 3.9 MMOL/L — SIGNIFICANT CHANGE UP (ref 3.5–5.3)
PROT SERPL-MCNC: 7 G/DL — SIGNIFICANT CHANGE UP (ref 6–8.3)
RBC # BLD: 4.3 M/UL — SIGNIFICANT CHANGE UP (ref 3.8–5.2)
RBC # FLD: 12.7 % — SIGNIFICANT CHANGE UP (ref 10.3–14.5)
SARS-COV-2 RNA SPEC QL NAA+PROBE: SIGNIFICANT CHANGE UP
SODIUM SERPL-SCNC: 137 MMOL/L — SIGNIFICANT CHANGE UP (ref 135–145)
TROPONIN T, HIGH SENSITIVITY RESULT: <6 NG/L — SIGNIFICANT CHANGE UP
WBC # BLD: 10.68 K/UL — HIGH (ref 3.8–10.5)
WBC # FLD AUTO: 10.68 K/UL — HIGH (ref 3.8–10.5)

## 2022-07-01 PROCEDURE — 99284 EMERGENCY DEPT VISIT MOD MDM: CPT

## 2022-07-01 PROCEDURE — 71275 CT ANGIOGRAPHY CHEST: CPT | Mod: 26,MD

## 2022-07-01 PROCEDURE — 71046 X-RAY EXAM CHEST 2 VIEWS: CPT | Mod: 26

## 2022-07-01 NOTE — ED PROVIDER NOTE - NSFOLLOWUPINSTRUCTIONS_ED_ALL_ED_FT
--take tylenol or motrin as needed for pain. Take tylenol 1000mg every 6 hours alternating with motrin 600 mg every 6 hours (take tylenol or motrin then three hours later take the other then three hours later take the first).  --today, the lab tests we did include basic blood tests, the imaging tests we did include ct angio chest. results significant for no blood clot in the lungs, no heart attack, no infection/abscess. we have included these test results in your paperwork. please take them to your follow-up appointment  --given that you were in the ED today, we recommend a followup visit with your general doctor (primary care doctor) within 7 days; we recommend URGENT outpatient imaging to rule out a blood clot in the upper extremity; please set up this imaging through your primary care doctor.  --your diagnosis is: arm pain, chest pain.   --return to the ED if your current symptoms worsen, if shortness of breath, if fevers, if edema/redness to the arm.

## 2022-07-01 NOTE — ED ADULT NURSE NOTE - COVID-19 ORDERING FACILITY
Pt. Mtr made a follow up appointment (6/12) for her daughter but needs a refill on Rx - albuterol (PROAIR RESPICLICK) 115 (90 Base) MCG/ACT inhaler prior to the visit. PARIS/RISSA/Antelmo

## 2022-07-01 NOTE — ED PROVIDER NOTE - PHYSICAL EXAMINATION
Gen: WDWN, NAD  HEENT: EOMI, no nasal discharge, mucous membranes moist  CV: RRR, 2+ radial pulses b/l  Resp:  no accessory muscle use, no increased work of breathing  GI: Abdomen soft non-distended, NTTP  MSK: No open wounds, no bruising, no LE edema. RUE without erythema/edema. + ttp along R upper arm, no ttp along R forearm.   Neuro: A&Ox4, following commands, moving all four extremities spontaneously  Psych: appropriate mood  Derm: R chest wall with port, no erythema.

## 2022-07-01 NOTE — ED PROVIDER NOTE - PATIENT PORTAL LINK FT
You can access the FollowMyHealth Patient Portal offered by [facility] by registering at the following website: [email or web address]. By joining [facility]’s FollowMyHealth portal, you will also be able to view your health information using other applications (apps) compatible with our system.

## 2022-07-01 NOTE — ED ADULT TRIAGE NOTE - CHIEF COMPLAINT QUOTE
pt dx with breast ca 1 month ago. had right chest wall port placed today ([facility]) complaining of pain to right arm (took Tylenol @ 10pm). also Endorsed lightheadedness, tingling to head and feet that has since resolved. "I think it was bc of my anxiety"  HX hypothyroidism, HLD, HLD.

## 2022-07-01 NOTE — ED PROVIDER NOTE - CLINICAL SUMMARY MEDICAL DECISION MAKING FREE TEXT BOX
Suzi Stephens MD, PGY-3:45YO F hx hypothyroidism, HLD, recent diagnosis of breast CA, p/w R chest wall, R arm pain, and presyncope. vss, pe ttp along R upper arm. suspect pain s/t recent procedure, consider PE given cancer hx and sudden onset pain. low suspicion RUE DVT given no edema/erythema, and location of pain is proximal/no pain distal to location of pain. plan for basic labs, ct angio chest, trop.

## 2022-07-01 NOTE — ED PROVIDER NOTE - PROGRESS NOTE DETAILS
Suzi Stephens MD, PGY-3: pt ready for dc home. ct negative for PE. will recommend outpatient RUE duplex study to r/o dvt given pt with RUE pain. low suspicion for dvt at this time given acute onset of symptoms, no edema distal to area of pain.
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
## 2022-07-01 NOTE — ED PROVIDER NOTE - OBJECTIVE STATEMENT
47YO F hx hypothyroidism, HLD, recent diagnosis of breast CA, p/w R chest wall, R arm pain, and presyncope. onset of symptoms earlier today. prior to port placement, pt endorses episode of presyncope, not associated with positional changes, no cp, no decreased po intake/n/v. episode resolved. later developed R chest wall/arm pain.

## 2022-07-01 NOTE — ED ADULT NURSE NOTE - OBJECTIVE STATEMENT
Break Coverage RN: Received pt in room 22A, ambulatory, pt A&Ox4, respirations even and unlabored b/l. Pt c/o dizziness from low blood pressure earlier, reports symptoms has resolved. Denies chest pain, dizziness, lightheadedness. Reports s/p R. chest wall port placement in the AM, newly dx with L. breast ca. Redness noted around R. chest wall port. Denies fever, chills. Reports hx of R. breast ca with lumpectomy. Per pt, able to use L. arm. IVL 20g angiocath placed on left AC. Labs sent. Sinus rhythm on cardiac monitor. Appears in no apparent distress. Will continue to monitor.

## 2022-07-01 NOTE — ED PROVIDER NOTE - NS ED ROS FT
Gen: Denies fevers  CV: + chest pain  Skin: denies bruising, erythema  Resp: Denies SOB  GI: Denies nausea, vomiting  Msk: + R arm pain

## 2022-07-08 ENCOUNTER — INPATIENT (INPATIENT)
Facility: HOSPITAL | Age: 47
LOS: 2 days | Discharge: HOME | End: 2022-07-11
Attending: HOSPITALIST | Admitting: HOSPITALIST

## 2022-07-08 VITALS
TEMPERATURE: 98 F | DIASTOLIC BLOOD PRESSURE: 59 MMHG | RESPIRATION RATE: 16 BRPM | OXYGEN SATURATION: 100 % | SYSTOLIC BLOOD PRESSURE: 81 MMHG | HEIGHT: 57 IN | HEART RATE: 80 BPM

## 2022-07-08 DIAGNOSIS — Z98.890 OTHER SPECIFIED POSTPROCEDURAL STATES: Chronic | ICD-10-CM

## 2022-07-08 DIAGNOSIS — Z98.891 HISTORY OF UTERINE SCAR FROM PREVIOUS SURGERY: Chronic | ICD-10-CM

## 2022-07-08 LAB
ALBUMIN SERPL ELPH-MCNC: 3.5 G/DL — SIGNIFICANT CHANGE UP (ref 3.3–5)
ALP SERPL-CCNC: 49 U/L — SIGNIFICANT CHANGE UP (ref 40–120)
ALT FLD-CCNC: 12 U/L — SIGNIFICANT CHANGE UP (ref 4–33)
ANION GAP SERPL CALC-SCNC: 7 MMOL/L — SIGNIFICANT CHANGE UP (ref 7–14)
ANISOCYTOSIS BLD QL: SLIGHT — SIGNIFICANT CHANGE UP
APPEARANCE UR: ABNORMAL
AST SERPL-CCNC: 18 U/L — SIGNIFICANT CHANGE UP (ref 4–32)
BACTERIA # UR AUTO: NEGATIVE — SIGNIFICANT CHANGE UP
BASOPHILS # BLD AUTO: 0 K/UL — SIGNIFICANT CHANGE UP (ref 0–0.2)
BASOPHILS NFR BLD AUTO: 0 % — SIGNIFICANT CHANGE UP (ref 0–2)
BILIRUB SERPL-MCNC: <0.2 MG/DL — SIGNIFICANT CHANGE UP (ref 0.2–1.2)
BILIRUB UR-MCNC: NEGATIVE — SIGNIFICANT CHANGE UP
BUN SERPL-MCNC: 14 MG/DL — SIGNIFICANT CHANGE UP (ref 7–23)
CALCIUM SERPL-MCNC: 8.2 MG/DL — LOW (ref 8.4–10.5)
CHLORIDE SERPL-SCNC: 104 MMOL/L — SIGNIFICANT CHANGE UP (ref 98–107)
CO2 SERPL-SCNC: 23 MMOL/L — SIGNIFICANT CHANGE UP (ref 22–31)
COLOR SPEC: SIGNIFICANT CHANGE UP
CREAT SERPL-MCNC: 0.69 MG/DL — SIGNIFICANT CHANGE UP (ref 0.5–1.3)
DIFF PNL FLD: ABNORMAL
EGFR: 107 ML/MIN/1.73M2 — SIGNIFICANT CHANGE UP
EOSINOPHIL # BLD AUTO: 0 K/UL — SIGNIFICANT CHANGE UP (ref 0–0.5)
EOSINOPHIL NFR BLD AUTO: 0 % — SIGNIFICANT CHANGE UP (ref 0–6)
EPI CELLS # UR: 1 /HPF — SIGNIFICANT CHANGE UP (ref 0–5)
FLUAV AG NPH QL: SIGNIFICANT CHANGE UP
FLUBV AG NPH QL: SIGNIFICANT CHANGE UP
GIANT PLATELETS BLD QL SMEAR: PRESENT — SIGNIFICANT CHANGE UP
GLUCOSE SERPL-MCNC: 77 MG/DL — SIGNIFICANT CHANGE UP (ref 70–99)
GLUCOSE UR QL: NEGATIVE — SIGNIFICANT CHANGE UP
HCT VFR BLD CALC: 35.8 % — SIGNIFICANT CHANGE UP (ref 34.5–45)
HGB BLD-MCNC: 11.5 G/DL — SIGNIFICANT CHANGE UP (ref 11.5–15.5)
HYALINE CASTS # UR AUTO: 2 /LPF — SIGNIFICANT CHANGE UP (ref 0–7)
IANC: 26.71 K/UL — HIGH (ref 1.8–7.4)
KETONES UR-MCNC: NEGATIVE — SIGNIFICANT CHANGE UP
LEUKOCYTE ESTERASE UR-ACNC: NEGATIVE — SIGNIFICANT CHANGE UP
LYMPHOCYTES # BLD AUTO: 2.42 K/UL — SIGNIFICANT CHANGE UP (ref 1–3.3)
LYMPHOCYTES # BLD AUTO: 7.8 % — LOW (ref 13–44)
MACROCYTES BLD QL: SLIGHT — SIGNIFICANT CHANGE UP
MCHC RBC-ENTMCNC: 29.5 PG — SIGNIFICANT CHANGE UP (ref 27–34)
MCHC RBC-ENTMCNC: 32.1 GM/DL — SIGNIFICANT CHANGE UP (ref 32–36)
MCV RBC AUTO: 91.8 FL — SIGNIFICANT CHANGE UP (ref 80–100)
MONOCYTES # BLD AUTO: 0.56 K/UL — SIGNIFICANT CHANGE UP (ref 0–0.9)
MONOCYTES NFR BLD AUTO: 1.8 % — LOW (ref 2–14)
NEUTROPHILS # BLD AUTO: 28.07 K/UL — HIGH (ref 1.8–7.4)
NEUTROPHILS NFR BLD AUTO: 88.7 % — HIGH (ref 43–77)
NEUTS BAND # BLD: 1.7 % — SIGNIFICANT CHANGE UP (ref 0–6)
NITRITE UR-MCNC: NEGATIVE — SIGNIFICANT CHANGE UP
NT-PROBNP SERPL-SCNC: 687 PG/ML — HIGH
PH UR: 6 — SIGNIFICANT CHANGE UP (ref 5–8)
PLAT MORPH BLD: NORMAL — SIGNIFICANT CHANGE UP
PLATELET # BLD AUTO: 277 K/UL — SIGNIFICANT CHANGE UP (ref 150–400)
PLATELET COUNT - ESTIMATE: NORMAL — SIGNIFICANT CHANGE UP
POLYCHROMASIA BLD QL SMEAR: SLIGHT — SIGNIFICANT CHANGE UP
POTASSIUM SERPL-MCNC: 4.3 MMOL/L — SIGNIFICANT CHANGE UP (ref 3.5–5.3)
POTASSIUM SERPL-SCNC: 4.3 MMOL/L — SIGNIFICANT CHANGE UP (ref 3.5–5.3)
PROT SERPL-MCNC: 5.9 G/DL — LOW (ref 6–8.3)
PROT UR-MCNC: NEGATIVE — SIGNIFICANT CHANGE UP
RBC # BLD: 3.9 M/UL — SIGNIFICANT CHANGE UP (ref 3.8–5.2)
RBC # FLD: 12.8 % — SIGNIFICANT CHANGE UP (ref 10.3–14.5)
RBC BLD AUTO: ABNORMAL
RBC CASTS # UR COMP ASSIST: 2 /HPF — SIGNIFICANT CHANGE UP (ref 0–4)
RSV RNA NPH QL NAA+NON-PROBE: SIGNIFICANT CHANGE UP
SARS-COV-2 RNA SPEC QL NAA+PROBE: SIGNIFICANT CHANGE UP
SODIUM SERPL-SCNC: 134 MMOL/L — LOW (ref 135–145)
SP GR SPEC: 1.02 — SIGNIFICANT CHANGE UP (ref 1–1.05)
TROPONIN T, HIGH SENSITIVITY RESULT: <6 NG/L — SIGNIFICANT CHANGE UP
UROBILINOGEN FLD QL: SIGNIFICANT CHANGE UP
WBC # BLD: 31.05 K/UL — HIGH (ref 3.8–10.5)
WBC # FLD AUTO: 31.05 K/UL — HIGH (ref 3.8–10.5)
WBC UR QL: 1 /HPF — SIGNIFICANT CHANGE UP (ref 0–5)

## 2022-07-08 PROCEDURE — 74177 CT ABD & PELVIS W/CONTRAST: CPT | Mod: 26,MA

## 2022-07-08 PROCEDURE — 71045 X-RAY EXAM CHEST 1 VIEW: CPT | Mod: 26

## 2022-07-08 PROCEDURE — 99285 EMERGENCY DEPT VISIT HI MDM: CPT

## 2022-07-08 RX ORDER — VANCOMYCIN HCL 1 G
1000 VIAL (EA) INTRAVENOUS ONCE
Refills: 0 | Status: COMPLETED | OUTPATIENT
Start: 2022-07-08 | End: 2022-07-08

## 2022-07-08 RX ORDER — ACETAMINOPHEN 500 MG
975 TABLET ORAL ONCE
Refills: 0 | Status: COMPLETED | OUTPATIENT
Start: 2022-07-08 | End: 2022-07-08

## 2022-07-08 RX ORDER — PIPERACILLIN AND TAZOBACTAM 4; .5 G/20ML; G/20ML
3.38 INJECTION, POWDER, LYOPHILIZED, FOR SOLUTION INTRAVENOUS ONCE
Refills: 0 | Status: COMPLETED | OUTPATIENT
Start: 2022-07-08 | End: 2022-07-08

## 2022-07-08 RX ORDER — SODIUM CHLORIDE 9 MG/ML
1000 INJECTION, SOLUTION INTRAVENOUS ONCE
Refills: 0 | Status: COMPLETED | OUTPATIENT
Start: 2022-07-08 | End: 2022-07-08

## 2022-07-08 RX ADMIN — Medication 250 MILLIGRAM(S): at 20:47

## 2022-07-08 RX ADMIN — Medication 975 MILLIGRAM(S): at 19:36

## 2022-07-08 RX ADMIN — Medication 100 UNIT(S): at 18:14

## 2022-07-08 RX ADMIN — PIPERACILLIN AND TAZOBACTAM 200 GRAM(S): 4; .5 INJECTION, POWDER, LYOPHILIZED, FOR SOLUTION INTRAVENOUS at 19:55

## 2022-07-08 RX ADMIN — SODIUM CHLORIDE 1000 MILLILITER(S): 9 INJECTION, SOLUTION INTRAVENOUS at 22:19

## 2022-07-08 RX ADMIN — Medication 975 MILLIGRAM(S): at 18:47

## 2022-07-08 NOTE — ED PROVIDER NOTE - CLINICAL SUMMARY MEDICAL DECISION MAKING FREE TEXT BOX
46y female pt who presents to ED s/p syncopal episode during NS administration. Denies head trauma, seizure like activity, fall. Endorsing nausea and lightheaded prior to syncope. Exam remarkable for suprapubic ttp. Will assess with labs, ekg, chest xray and urine. syncope work up

## 2022-07-08 NOTE — ED PROVIDER NOTE - PROGRESS NOTE DETAILS
Cory Lin, DO PGY-2: Received sign out from day resident, pt had syncopal episode while receiving NS at Onc office. Pt has new breast ca on chemo. Pt with 30K WBC with L shift, concerning for bacteremia. Pt pending CTAP and admission. Cory Lin, DO PGY-2: discussed case with hospitalist Dr. Silverman, requests procal and maintenance fluids. He will f/u the results and accepts the admission Cory Lin, DO PGY-2: Attempted to call pt PMD Dr. Churchill with no answer. Discussed case with hospitalist Dr. Silverman, requests procal and maintenance fluids. He will f/u the results and accepts the admission

## 2022-07-08 NOTE — ED ADULT NURSE NOTE - OBJECTIVE STATEMENT
Natasha RN- received pt in room 7, 46 yr/o female A+Ox4, ambulatory at baseline. PMH of Breast left side diagnosed Torie 10,  presented to the ED S?p syncopal event after receiving chemo. pt does not remembered event son at bedside states pt had LOC, states she was sitting in a chair when event happened. no injuries occurred. right chest wall port noted, site accessed from [facility]. pending MD orders. will continue to monitor. Natasha RN- received pt in room 7, 46 yr/o female A+Ox4, ambulatory at baseline. PMH of Breast left side diagnosed Torie 10,  presented to the ED S/P syncopal event while receiving a bolus on NS at [facility]. pt does not remembered event son at bedside states pt had LOC, states she was sitting in a chair when event happened. no injuries occurred. right chest wall port noted, site accessed from [facility], states she is experiencing pain at site. port deacessed, requested by patient, site is clean dry and intact. left Ac 20g placed, labs drawn and sent.  pending MD orders. will continue to monitor.

## 2022-07-08 NOTE — ED PROVIDER NOTE - ATTENDING CONTRIBUTION TO CARE
I (Humphrey) agree with above, I performed a history and physical. Counseled srikanth medical staff, physician assistant, and/or medical student on medical decision making as documented. Medical decisions and treatment interventions were made in real time during the patient encounter. Additionally and/or with the following exceptions: The patient presented to the ED with syncopal episode as above. She was given zofran with partial resolution of symptoms. The patient was non-toxic appearing, but later found to have leukocytosis to 31. Micromedex was queried and the Bone marrow stimulator was found to have a reported less than 1% chance of leukocytosis. Therefore, abx were given, and patient  was to be admitted for abx and syncope workup. The patient's condition was not amenable to outpatient treatment due either the lack of feasibility of outpatient care coordination, possibility for further decompensation with adverse outcome if discharge, or treatments and diagnostic  modalities only available during an inpatient hospitalization. I reviewed monitor data at least 2 times 10 minutes or more apart during the patients stay.

## 2022-07-08 NOTE — ED ADULT NURSE NOTE - NSIMPLEMENTINTERV_GEN_ALL_ED
Implemented All Fall with Harm Risk Interventions:  [place] to call system. Call bell, personal items and telephone within reach. Instruct patient to call for assistance. Room bathroom lighting operational. Non-slip footwear when patient is off stretcher. Physically safe environment: no spills, clutter or unnecessary equipment. Stretcher in lowest position, wheels locked, appropriate side rails in place. Provide visual cue, wrist band, yellow gown, etc. Monitor gait and stability. Monitor for mental status changes and reorient to person, place, and time. Review medications for side effects contributing to fall risk. Reinforce activity limits and safety measures with patient and family. Provide visual clues: red socks.

## 2022-07-08 NOTE — ED PROVIDER NOTE - NS ED ROS FT
CONST: no fevers, no chills  EYES: no pain, no vision changes  ENT: no sore throat, no ear pain, no change in hearing  CV: no chest pain, no leg swelling. +chest discomfort  RESP: no shortness of breath, no cough  ABD: no abdominal pain, no vomiting, no diarrhea. +nausea  : no dysuria, no flank pain, no hematuria  MSK: no back pain, no extremity pain  NEURO: +syncope  SKIN:  no rash

## 2022-07-08 NOTE — ED PROVIDER NOTE - OBJECTIVE STATEMENT
46y female pt PMHx breast carcinoma on chemo with recent port placement (1week ago) who presents to ED for a syncopal episode today during NS administration. Patient endorsing presyncopal symptoms of nausea and lightheadedness. syncopal episode was witnessed by family. Son refers syncope lasted 20-30 seconds. Denies urinary incontinence, tongue bite, head trauma, fall. Patient post syncope patient was given nausea which has since improved after zofran administration.

## 2022-07-08 NOTE — ED ADULT TRIAGE NOTE - CHIEF COMPLAINT QUOTE
[facility] with [facility]/  during infusion of NS .  NS via  R CW port .  Denies chest pain, sob.  Zofran 8 mg given for nausea , now resolved

## 2022-07-08 NOTE — ED PROVIDER NOTE - PHYSICAL EXAMINATION
GENERAL: Awake, alert, NAD  HEENT: NC/AT, moist mucous membranes  LUNGS: CTAB, no wheezes or crackles. Port seen on R chest wall  CARDIAC: RRR, no m/r/g  ABDOMEN: Soft ttp over suprapubic area   BACK: no CVA tenderness  EXT: No edema, no calf tenderness, 2+ PT pulses BL  NEURO: A&Ox3. Moving all extremities.  SKIN: Warm and dry. No rash.

## 2022-07-09 DIAGNOSIS — C50.919 MALIGNANT NEOPLASM OF UNSPECIFIED SITE OF UNSPECIFIED FEMALE BREAST: ICD-10-CM

## 2022-07-09 DIAGNOSIS — Z29.9 ENCOUNTER FOR PROPHYLACTIC MEASURES, UNSPECIFIED: ICD-10-CM

## 2022-07-09 DIAGNOSIS — D72.829 ELEVATED WHITE BLOOD CELL COUNT, UNSPECIFIED: ICD-10-CM

## 2022-07-09 DIAGNOSIS — E78.5 HYPERLIPIDEMIA, UNSPECIFIED: ICD-10-CM

## 2022-07-09 DIAGNOSIS — E03.9 HYPOTHYROIDISM, UNSPECIFIED: ICD-10-CM

## 2022-07-09 DIAGNOSIS — R55 SYNCOPE AND COLLAPSE: ICD-10-CM

## 2022-07-09 DIAGNOSIS — I10 ESSENTIAL (PRIMARY) HYPERTENSION: ICD-10-CM

## 2022-07-09 PROCEDURE — 99223 1ST HOSP IP/OBS HIGH 75: CPT

## 2022-07-09 RX ORDER — LANOLIN ALCOHOL/MO/W.PET/CERES
3 CREAM (GRAM) TOPICAL AT BEDTIME
Refills: 0 | Status: DISCONTINUED | OUTPATIENT
Start: 2022-07-09 | End: 2022-07-11

## 2022-07-09 RX ORDER — SODIUM CHLORIDE 9 MG/ML
1000 INJECTION, SOLUTION INTRAVENOUS
Refills: 0 | Status: DISCONTINUED | OUTPATIENT
Start: 2022-07-09 | End: 2022-07-11

## 2022-07-09 RX ORDER — ACETAMINOPHEN 500 MG
650 TABLET ORAL EVERY 6 HOURS
Refills: 0 | Status: DISCONTINUED | OUTPATIENT
Start: 2022-07-09 | End: 2022-07-11

## 2022-07-09 RX ORDER — LEVOTHYROXINE SODIUM 125 MCG
75 TABLET ORAL DAILY
Refills: 0 | Status: DISCONTINUED | OUTPATIENT
Start: 2022-07-09 | End: 2022-07-11

## 2022-07-09 RX ORDER — ONDANSETRON 8 MG/1
4 TABLET, FILM COATED ORAL EVERY 8 HOURS
Refills: 0 | Status: DISCONTINUED | OUTPATIENT
Start: 2022-07-09 | End: 2022-07-11

## 2022-07-09 RX ORDER — ENOXAPARIN SODIUM 100 MG/ML
40 INJECTION SUBCUTANEOUS EVERY 24 HOURS
Refills: 0 | Status: DISCONTINUED | OUTPATIENT
Start: 2022-07-09 | End: 2022-07-11

## 2022-07-09 RX ADMIN — Medication 75 MICROGRAM(S): at 08:51

## 2022-07-09 RX ADMIN — ENOXAPARIN SODIUM 40 MILLIGRAM(S): 100 INJECTION SUBCUTANEOUS at 10:05

## 2022-07-09 RX ADMIN — SODIUM CHLORIDE 150 MILLILITER(S): 9 INJECTION, SOLUTION INTRAVENOUS at 01:12

## 2022-07-09 RX ADMIN — SODIUM CHLORIDE 150 MILLILITER(S): 9 INJECTION, SOLUTION INTRAVENOUS at 08:36

## 2022-07-09 NOTE — H&P ADULT - PROBLEM SELECTOR PLAN 2
Patient without fever or localizing signs of infection  Influenza and COVID-19 panels are negative  CXR with clear lungs  UA inconsistent with UTI  Suspect this is the result of Neulasta (received 2 days prior to admission)  -will f/u bcx  -monitor off abx for now

## 2022-07-09 NOTE — ED ADULT NURSE REASSESSMENT NOTE - NS ED NURSE REASSESS COMMENT FT1
Pt A&Ox4, resting in stretcher. Pt denies chest pain, sob, abd pain, ha, dizziness, n/v/d, fevers/chills at this time. Respirations even and unlabored, no accessory muscle use. Remains on cardiac monitor. IV intact, fluids infusing. Vitals as noted. Stretcher in lowest position, side rail up, call bell within reach.
break coverage RN- NAD. Respirations even and unlabored with equal chest rise bilaterally.  VSS. will continue to monitor.
patient is resting comfortably at this time, LR infusing as ordered.
Pt A&Ox4, resting in stretcher. Pt denies chest pain, sob, abd pain, ha, dizziness, n/v/d, fevers/chills at this time. Respirations even and unlabored, no accessory muscle use. To be give LR bolus. vitals as noted
Pt A&Ox4,resting in stretcher. Pt denies chest pain, sob, abd pain, ha, dizziness, n/v/d, fevers/chills at this time. Respirations even and unlabored, no accessory muscle use. remains on cardiac monitor. Vitals as noted

## 2022-07-09 NOTE — H&P ADULT - PROBLEM SELECTOR PLAN 3
Started chemotherapy this week (cytox, doxorubicin), received Neulasta 2 days prior to admission  Chemo port placed about a week prior to admission; no erythema or purulence on exam  -outpatient oncology f/u
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
## 2022-07-09 NOTE — H&P ADULT - PROBLEM SELECTOR PLAN 1
Patient presents after passing out for about 30 seconds during IVNS infusion at oncologist's office  No post-ictal state, low suspicion for seizure  BP 81/59 on triage, now 90s/60s after IVF  Afebrile and without any localizing sx of infection  Suspect this was likely vasovagal vs dehydration-mediated  -Will monitor on telemetry, though lower suspicion for arrhythmia  -c/w IVF  -defer abx at this time as patient is w/o sx of infection, CXR is clear, UA is normal
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
## 2022-07-09 NOTE — H&P ADULT - ASSESSMENT
46W h/o breast cancer on chemotherapy, hypothyroidism presents after episode of passing out at oncology visit.

## 2022-07-09 NOTE — CHART NOTE - NSCHARTNOTEFT_GEN_A_CORE
Patient seen and examined. Chart and labs reviewed.    Patient feels the same. Oral mucosa remain dry. IVF are running.    VS without orthostatic hypotension, though, SBP 88 on lying down. Patient is AAOx3 and w/o dizziness/LH.    Low suspicion for valvular disease--no murmur on exam, no exertional component to syncope. Defer TTE at this time.

## 2022-07-09 NOTE — CONSULT NOTE ADULT - ASSESSMENT
46 year old F hx of recently dx Left sided breast poorly differentiated cancer ER +, MA slightly +, Her­2­layne 0, Ki­67 > 50%.  EF 60%. S/P first AC and neulasta on 7.6.22 follows with Dr Marquise Cook presents after syncopal episode in our infusion suite    1) Syncope  Unknown etiology, could very well be from dehydration  Given seizure like episode witnessed in infusion suite, would consult neurology for thorough evaluation  Cardiology consultation may be of benefit as well  F/u work up ordered by medicine team  Continue IV hydration    2) Breast ca   With hx as above  F/u with Dr Cook outpt    3) Leukocytosis  from recent neulasta    Thank you for the courtesy of this consultation and we will continue to follow.     Jarrett Jacome MD  [facility]  Cell: [phone]   46 year old F hx of recently dx Left sided breast poorly differentiated cancer ER +, LA slightly +, Her­2­layne 0, Ki­67 > 50%.  EF 60%. S/P first AC and neulasta on 7.6.22 follows with Dr Marquise Cook presents after syncopal episode in our infusion suite    1) Syncope  Unknown etiology, could very well be from dehydration  Given seizure like episode witnessed in infusion suite, would consult neurology for thorough evaluation  Cardiology consultation may be of benefit as well- please consult Dr Guru Pena's team  F/u work up ordered by medicine team  Continue IV hydration    2) Breast ca   With hx as above  F/u with Dr Cook outpt    3) Leukocytosis  from recent neulasta    Thank you for the courtesy of this consultation and we will continue to follow.     Jarrett Jacome MD  [facility]  Cell: [phone]
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
## 2022-07-10 LAB
ANION GAP SERPL CALC-SCNC: 12 MMOL/L — SIGNIFICANT CHANGE UP (ref 7–14)
BASOPHILS # BLD AUTO: 0.15 K/UL — SIGNIFICANT CHANGE UP (ref 0–0.2)
BASOPHILS NFR BLD AUTO: 0.6 % — SIGNIFICANT CHANGE UP (ref 0–2)
BUN SERPL-MCNC: 9 MG/DL — SIGNIFICANT CHANGE UP (ref 7–23)
CALCIUM SERPL-MCNC: 8.5 MG/DL — SIGNIFICANT CHANGE UP (ref 8.4–10.5)
CHLORIDE SERPL-SCNC: 102 MMOL/L — SIGNIFICANT CHANGE UP (ref 98–107)
CO2 SERPL-SCNC: 23 MMOL/L — SIGNIFICANT CHANGE UP (ref 22–31)
CREAT SERPL-MCNC: 0.74 MG/DL — SIGNIFICANT CHANGE UP (ref 0.5–1.3)
CULTURE RESULTS: SIGNIFICANT CHANGE UP
EGFR: 100 ML/MIN/1.73M2 — SIGNIFICANT CHANGE UP
EOSINOPHIL # BLD AUTO: 0.14 K/UL — SIGNIFICANT CHANGE UP (ref 0–0.5)
EOSINOPHIL NFR BLD AUTO: 0.5 % — SIGNIFICANT CHANGE UP (ref 0–6)
GLUCOSE BLDC GLUCOMTR-MCNC: 96 MG/DL — SIGNIFICANT CHANGE UP (ref 70–99)
GLUCOSE SERPL-MCNC: 44 MG/DL — CRITICAL LOW (ref 70–99)
HCT VFR BLD CALC: 34.3 % — LOW (ref 34.5–45)
HGB BLD-MCNC: 11 G/DL — LOW (ref 11.5–15.5)
IANC: 19.77 K/UL — HIGH (ref 1.8–7.4)
IMM GRANULOCYTES NFR BLD AUTO: 20.9 % — HIGH (ref 0–1.5)
LYMPHOCYTES # BLD AUTO: 1.05 K/UL — SIGNIFICANT CHANGE UP (ref 1–3.3)
LYMPHOCYTES # BLD AUTO: 3.9 % — LOW (ref 13–44)
MAGNESIUM SERPL-MCNC: 1.9 MG/DL — SIGNIFICANT CHANGE UP (ref 1.6–2.6)
MCHC RBC-ENTMCNC: 29.2 PG — SIGNIFICANT CHANGE UP (ref 27–34)
MCHC RBC-ENTMCNC: 32.1 GM/DL — SIGNIFICANT CHANGE UP (ref 32–36)
MCV RBC AUTO: 91 FL — SIGNIFICANT CHANGE UP (ref 80–100)
MONOCYTES # BLD AUTO: 0.19 K/UL — SIGNIFICANT CHANGE UP (ref 0–0.9)
MONOCYTES NFR BLD AUTO: 0.7 % — LOW (ref 2–14)
NEUTROPHILS # BLD AUTO: 19.77 K/UL — HIGH (ref 1.8–7.4)
NEUTROPHILS NFR BLD AUTO: 73.4 % — SIGNIFICANT CHANGE UP (ref 43–77)
NRBC # BLD: 0 /100 WBCS — SIGNIFICANT CHANGE UP
NRBC # FLD: 0 K/UL — SIGNIFICANT CHANGE UP
PHOSPHATE SERPL-MCNC: 3 MG/DL — SIGNIFICANT CHANGE UP (ref 2.5–4.5)
PLATELET # BLD AUTO: 251 K/UL — SIGNIFICANT CHANGE UP (ref 150–400)
POTASSIUM SERPL-MCNC: 4.5 MMOL/L — SIGNIFICANT CHANGE UP (ref 3.5–5.3)
POTASSIUM SERPL-SCNC: 4.5 MMOL/L — SIGNIFICANT CHANGE UP (ref 3.5–5.3)
RBC # BLD: 3.77 M/UL — LOW (ref 3.8–5.2)
RBC # FLD: 12.8 % — SIGNIFICANT CHANGE UP (ref 10.3–14.5)
SODIUM SERPL-SCNC: 137 MMOL/L — SIGNIFICANT CHANGE UP (ref 135–145)
SPECIMEN SOURCE: SIGNIFICANT CHANGE UP
WBC # BLD: 26.92 K/UL — HIGH (ref 3.8–10.5)
WBC # FLD AUTO: 26.92 K/UL — HIGH (ref 3.8–10.5)

## 2022-07-10 PROCEDURE — 99232 SBSQ HOSP IP/OBS MODERATE 35: CPT

## 2022-07-10 RX ADMIN — SODIUM CHLORIDE 150 MILLILITER(S): 9 INJECTION, SOLUTION INTRAVENOUS at 07:25

## 2022-07-10 RX ADMIN — ENOXAPARIN SODIUM 40 MILLIGRAM(S): 100 INJECTION SUBCUTANEOUS at 09:40

## 2022-07-10 RX ADMIN — SODIUM CHLORIDE 150 MILLILITER(S): 9 INJECTION, SOLUTION INTRAVENOUS at 00:47

## 2022-07-10 RX ADMIN — Medication 75 MICROGRAM(S): at 05:30

## 2022-07-10 NOTE — PROGRESS NOTE ADULT - SUBJECTIVE AND OBJECTIVE BOX
pt seen and examined, no complaints, ROS - .      acetaminophen     Tablet .. 650 milliGRAM(s) Oral every 6 hours PRN  aluminum hydroxide/magnesium hydroxide/simethicone Suspension 30 milliLiter(s) Oral every 4 hours PRN  enoxaparin Injectable 40 milliGRAM(s) SubCutaneous every 24 hours  lactated ringers. 1000 milliLiter(s) IV Continuous <Continuous>  levothyroxine 75 MICROGram(s) Oral daily  melatonin 3 milliGRAM(s) Oral at bedtime PRN  ondansetron Injectable 4 milliGRAM(s) IV Push every 8 hours PRN                            11.0   26.92 )-----------( 251      ( 10 Jul 2022 05:21 )             34.3       Hemoglobin: 11.0 g/dL (07-10 @ 05:21)  Hemoglobin: 11.5 g/dL (07-08 @ 18:04)      07-10    137  |  102  |  9   ----------------------------<  44<LL>  4.5   |  23  |  0.74    Ca    8.5      10 Jul 2022 05:21  Phos  3.0     07-10  Mg     1.90     07-10    TPro  5.9<L>  /  Alb  3.5  /  TBili  <0.2  /  DBili  x   /  AST  18  /  ALT  12  /  AlkPhos  49  07-08    Creatinine Trend: 0.74<--, 0.69<--, 0.78<--, 0.82<--    COAGS:           T(C): 36.3 (07-10-22 @ 05:30), Max: 36.8 (07-09-22 @ 21:30)  HR: 87 (07-10-22 @ 05:30) (79 - 87)  BP: 114/79 (07-10-22 @ 05:30) (96/71 - 114/79)  RR: 18 (07-10-22 @ 05:30) (16 - 18)  SpO2: 100% (07-10-22 @ 05:30) (99% - 100%)  Wt(kg): --    I&O's Summary    HEENT:   Normal oral mucosa, PERRL, EOMI	  Lymphatic: No lymphadenopathy , no edema  Cardiovascular: Normal S1 S2, No JVD, No murmurs , Peripheral pulses palpable 2+ bilaterally  Respiratory: Lungs clear to auscultation, normal effort 	  Gastrointestinal:  Soft, Non-tender, + BS	  Skin: No rashes, No ecchymoses, No cyanosis, warm to touch  Musculoskeletal: Normal range of motion, normal strength  Psychiatry:  Mood & affect appropriate      TELEMETRY: SR 	        ASSESSMENT/PLAN:  46 year female with history of breast CA on chemotherapy cytox, doxorubicin), hypothyroidism who is being seen for syncope.     -ECG with normal SR with narrow qrs   - Tele stable   -check orthostatics  - cont IV hydration   -  GI / DVT prophylaxis.  - keep K>4, mag >2.0   -would check TTE to rule out structural heart abnormalities and to evaluate LVEF  D/W Dr Pena

## 2022-07-10 NOTE — PROVIDER CONTACT NOTE (CRITICAL VALUE NOTIFICATION) - ACTION/TREATMENT ORDERED:
checking fingerstick and will notify provider checking fingerstick and will notify provider  fingerstick of 96- provider aware

## 2022-07-10 NOTE — PATIENT PROFILE ADULT - FALL HARM RISK - UNIVERSAL INTERVENTIONS
Bed in lowest position, wheels locked, appropriate side rails in place/Call bell, personal items and telephone in reach/Instruct patient to call for assistance before getting out of bed or chair/Non-slip footwear when patient is out of bed/[place] to call system/Physically safe environment - no spills, clutter or unnecessary equipment/Purposeful Proactive Rounding/Room/bathroom lighting operational, light cord in reach

## 2022-07-10 NOTE — PROGRESS NOTE ADULT - SUBJECTIVE AND OBJECTIVE BOX
Patient is a 46y old  Female who presents with a chief complaint of Syncope (2022 15:08)      SUBJECTIVE / OVERNIGHT EVENTS:    No events overnight. This AM, patient without n/v/d/cp/sob.      MEDICATIONS  (STANDING):  enoxaparin Injectable 40 milliGRAM(s) SubCutaneous every 24 hours  lactated ringers. 1000 milliLiter(s) (150 mL/Hr) IV Continuous <Continuous>  levothyroxine 75 MICROGram(s) Oral daily    MEDICATIONS  (PRN):  acetaminophen     Tablet .. 650 milliGRAM(s) Oral every 6 hours PRN Temp greater or equal to 38C (100.4F), Mild Pain (1 - 3)  aluminum hydroxide/magnesium hydroxide/simethicone Suspension 30 milliLiter(s) Oral every 4 hours PRN Dyspepsia  melatonin 3 milliGRAM(s) Oral at bedtime PRN Insomnia  ondansetron Injectable 4 milliGRAM(s) IV Push every 8 hours PRN Nausea and/or Vomiting      PHYSICAL EXAM:  T(C): 36.3 (07-10-22 @ 05:30), Max: 36.8 (22 @ 21:30)  HR: 87 (07-10-22 @ 05:30) (79 - 87)  BP: 114/79 (07-10-22 @ 05:30) (96/71 - 114/79)  RR: 18 (07-10-22 @ 05:30) (16 - 18)  SpO2: 100% (07-10-22 @ 05:30) (99% - 100%)  I&O's Summary    GENERAL: NAD, well-developed  HEAD:  Atraumatic, Normocephalic, MMM  CHEST/LUNG: No use of accessory muscles, CTAB, breathing non-labored  COR: RR, no mrcg; +R chest port without erythema/purulence  ABD: Soft, ND/NT, +BS  PSYCH: AAOx3  NEUROLOGY: CN II-XII grossly intact, moving all extremities  SKIN: No rashes or lesions  EXT: wwp, no cce    LABS:  CAPILLARY BLOOD GLUCOSE      POCT Blood Glucose.: 96 mg/dL (10 Jul 2022 09:00)                          11.0   26.92 )-----------( 251      ( 10 Jul 2022 05:21 )             34.3     07-10    137  |  102  |  9   ----------------------------<  44<LL>  4.5   |  23  |  0.74    Ca    8.5      10 Jul 2022 05:21  Phos  3.0     07-10  Mg     1.90     07-10    TPro  5.9<L>  /  Alb  3.5  /  TBili  <0.2  /  DBili  x   /  AST  18  /  ALT  12  /  AlkPhos  49  07-08          Urinalysis Basic - ( 2022 18:18 )    Color: Light Yellow / Appearance: Slightly Turbid / S.017 / pH: x  Gluc: x / Ketone: Negative  / Bili: Negative / Urobili: <2 mg/dL   Blood: x / Protein: Negative / Nitrite: Negative   Leuk Esterase: Negative / RBC: 2 /HPF / WBC 1 /HPF   Sq Epi: x / Non Sq Epi: 1 /HPF / Bacteria: Negative        Culture - Blood (collected 2022 19:57)  Source: .Blood Blood  Preliminary Report (10 Jul 2022 01:01):    No growth to date.    Culture - Blood (collected 2022 19:40)  Source: .Blood Blood  Preliminary Report (10 Jul 2022 01:01):    No growth to date.        RADIOLOGY & ADDITIONAL TESTS:    Telemetry Personally Reviewed - NSR    Imaging Personally Reviewed -     Imaging Reviewed -     Consultant(s) Notes Reviewed -   onc ,cardio    Care Discussed with Consultants/Other Providers -

## 2022-07-11 ENCOUNTER — TRANSCRIPTION ENCOUNTER (OUTPATIENT)
Age: 47
End: 2022-07-11

## 2022-07-11 VITALS — TEMPERATURE: 98 F

## 2022-07-11 LAB
A1C WITH ESTIMATED AVERAGE GLUCOSE RESULT: 5.8 % — HIGH (ref 4–5.6)
ANION GAP SERPL CALC-SCNC: 9 MMOL/L — SIGNIFICANT CHANGE UP (ref 7–14)
BASOPHILS # BLD AUTO: 0 K/UL — SIGNIFICANT CHANGE UP (ref 0–0.2)
BASOPHILS NFR BLD AUTO: 0 % — SIGNIFICANT CHANGE UP (ref 0–2)
BUN SERPL-MCNC: 13 MG/DL — SIGNIFICANT CHANGE UP (ref 7–23)
CALCIUM SERPL-MCNC: 8.3 MG/DL — LOW (ref 8.4–10.5)
CHLORIDE SERPL-SCNC: 103 MMOL/L — SIGNIFICANT CHANGE UP (ref 98–107)
CO2 SERPL-SCNC: 23 MMOL/L — SIGNIFICANT CHANGE UP (ref 22–31)
CREAT SERPL-MCNC: 0.61 MG/DL — SIGNIFICANT CHANGE UP (ref 0.5–1.3)
EGFR: 110 ML/MIN/1.73M2 — SIGNIFICANT CHANGE UP
EOSINOPHIL # BLD AUTO: 0 K/UL — SIGNIFICANT CHANGE UP (ref 0–0.5)
EOSINOPHIL NFR BLD AUTO: 0 % — SIGNIFICANT CHANGE UP (ref 0–6)
ESTIMATED AVERAGE GLUCOSE: 120 — SIGNIFICANT CHANGE UP
GLUCOSE SERPL-MCNC: 89 MG/DL — SIGNIFICANT CHANGE UP (ref 70–99)
HCT VFR BLD CALC: 31.9 % — LOW (ref 34.5–45)
HGB BLD-MCNC: 10.6 G/DL — LOW (ref 11.5–15.5)
IANC: 13.99 K/UL — HIGH (ref 1.8–7.4)
LYMPHOCYTES # BLD AUTO: 0.48 K/UL — LOW (ref 1–3.3)
LYMPHOCYTES # BLD AUTO: 2.7 % — LOW (ref 13–44)
MAGNESIUM SERPL-MCNC: 1.8 MG/DL — SIGNIFICANT CHANGE UP (ref 1.6–2.6)
MCHC RBC-ENTMCNC: 29.5 PG — SIGNIFICANT CHANGE UP (ref 27–34)
MCHC RBC-ENTMCNC: 33.2 GM/DL — SIGNIFICANT CHANGE UP (ref 32–36)
MCV RBC AUTO: 88.9 FL — SIGNIFICANT CHANGE UP (ref 80–100)
MONOCYTES # BLD AUTO: 0 K/UL — SIGNIFICANT CHANGE UP (ref 0–0.9)
MONOCYTES NFR BLD AUTO: 0 % — LOW (ref 2–14)
NEUTROPHILS # BLD AUTO: 17.4 K/UL — HIGH (ref 1.8–7.4)
NEUTROPHILS NFR BLD AUTO: 93.7 % — HIGH (ref 43–77)
PHOSPHATE SERPL-MCNC: 3.3 MG/DL — SIGNIFICANT CHANGE UP (ref 2.5–4.5)
PLATELET # BLD AUTO: 220 K/UL — SIGNIFICANT CHANGE UP (ref 150–400)
POTASSIUM SERPL-MCNC: 4 MMOL/L — SIGNIFICANT CHANGE UP (ref 3.5–5.3)
POTASSIUM SERPL-SCNC: 4 MMOL/L — SIGNIFICANT CHANGE UP (ref 3.5–5.3)
RBC # BLD: 3.59 M/UL — LOW (ref 3.8–5.2)
RBC # FLD: 12.6 % — SIGNIFICANT CHANGE UP (ref 10.3–14.5)
SODIUM SERPL-SCNC: 135 MMOL/L — SIGNIFICANT CHANGE UP (ref 135–145)
T4 FREE SERPL-MCNC: 1.2 NG/DL — SIGNIFICANT CHANGE UP (ref 0.9–1.8)
TSH SERPL-MCNC: 3.57 UIU/ML — SIGNIFICANT CHANGE UP (ref 0.27–4.2)
WBC # BLD: 17.88 K/UL — HIGH (ref 3.8–10.5)
WBC # FLD AUTO: 17.88 K/UL — HIGH (ref 3.8–10.5)

## 2022-07-11 PROCEDURE — 93306 TTE W/DOPPLER COMPLETE: CPT | Mod: 26

## 2022-07-11 PROCEDURE — 99239 HOSP IP/OBS DSCHRG MGMT >30: CPT

## 2022-07-11 RX ORDER — AMLODIPINE BESYLATE 2.5 MG/1
1 TABLET ORAL
Qty: 0 | Refills: 0 | DISCHARGE

## 2022-07-11 RX ORDER — ATORVASTATIN CALCIUM 80 MG/1
1 TABLET, FILM COATED ORAL
Qty: 0 | Refills: 0 | DISCHARGE

## 2022-07-11 RX ADMIN — ENOXAPARIN SODIUM 40 MILLIGRAM(S): 100 INJECTION SUBCUTANEOUS at 11:24

## 2022-07-11 RX ADMIN — Medication 75 MICROGRAM(S): at 05:12

## 2022-07-11 RX ADMIN — SODIUM CHLORIDE 150 MILLILITER(S): 9 INJECTION, SOLUTION INTRAVENOUS at 05:12

## 2022-07-11 NOTE — DISCHARGE NOTE NURSING/CASE MANAGEMENT/SOCIAL WORK - NSDCPEFALRISK_GEN_ALL_CORE
For information on Fall & Injury Prevention, visit: [email or web address] OR  [email or web address] OR  https://www.cdc.gov/steadi/patient.html

## 2022-07-11 NOTE — DISCHARGE NOTE NURSING/CASE MANAGEMENT/SOCIAL WORK - PATIENT PORTAL LINK FT
You can access the FollowMyHealth Patient Portal offered by [facility] by registering at the following website: [email or web address]. By joining [employer]’s FollowMyHealth portal, you will also be able to view your health information using other applications (apps) compatible with our system.

## 2022-07-11 NOTE — DISCHARGE NOTE PROVIDER - HOSPITAL COURSE
46W h/o breast cancer on chemotherapy, hypothyroidism presents after episode of passing out at oncology visit.      Problem/Plan - 1:  ·  Problem: Syncope.   ·  Plan: Patient presents after passing out for about 30 seconds during IVNS infusion at oncologist's office  No post-ictal state, tongue biting, urinary/fecal incontinence, jerking movements during episode; incredibly low suspicion for seizure activity  patient had dry oral mucosa; she was likely dehydrated. This is further supported by BP 81/59 on triage, now improved after IVF  No murmur on auscultation, no e/o CHF; patient has unlimited exercise tolerance. Low suspicion for valvular/structural heart disease.   Afebrile and without any localizing sx of infection; BCx w/o growth  Telemetry is without events; patient is NSR, doubt arrhythmia as cause  dc after TTE.     Problem/Plan - 2:  ·  Problem: Leukocytosis.   ·  Plan: Patient without fever or localizing signs of infection  Influenza and COVID-19 panels are negative  CXR with clear lungs  UA inconsistent with UTI  BCx NGTD  Suspect this is the result of Neulasta (received 2 days prior to admission).     Problem/Plan - 3:  ·  Problem: Breast cancer.   ·  Plan: Started chemotherapy this week (cytox, doxorubicin), received Neulasta 2 days prior to admission  Chemo port placed about a week prior to admission; no erythema or purulence on exam  -outpatient oncology f/u.     46W h/o breast cancer on chemotherapy, hypothyroidism presents after episode of passing out at oncology visit during an infusion. Found to be hypotensive likely in the setting of dehydration. BP improved post IVF. TTE normal and no events on telemetry. Infectious workup negative. Noted to have leukocytosis likely i/s/o recent Neulasta injection. CTAP without acute pathology.     Case discussed with Dr. Juarez on 7/11/22. The patient is medically stable for discharge and has appropriate follow up.

## 2022-07-11 NOTE — PROGRESS NOTE ADULT - PROBLEM SELECTOR PLAN 7
Enoxaparin 40mg q24h for VTE ppx    I spent 35 minutes counseling this patient and coordinating their discharge.
Enoxaparin 40mg q24h for VTE ppx    dc home if TTE ok. TIme spent 40 mins

## 2022-07-11 NOTE — DISCHARGE NOTE PROVIDER - NSDCMRMEDTOKEN_GEN_ALL_CORE_FT
amLODIPine 2.5 mg oral tablet: 1 tab(s) orally once a day  atorvastatin 10 mg oral tablet: 1 tab(s) orally once a day  levothyroxine 75 mcg (0.075 mg) oral capsule: 1 cap(s) orally once a day   atorvastatin 10 mg oral tablet: 1 tab(s) orally once a day    unclear if you are taking this at home, please follow up with your PCP   levothyroxine 75 mcg (0.075 mg) oral capsule: 1 cap(s) orally once a day

## 2022-07-11 NOTE — PROGRESS NOTE ADULT - ASSESSMENT
46W h/o breast cancer on chemotherapy, hypothyroidism presents after episode of passing out at oncology visit.
46W h/o breast cancer on chemotherapy, hypothyroidism presents after episode of passing out at oncology visit.

## 2022-07-11 NOTE — PROGRESS NOTE ADULT - SUBJECTIVE AND OBJECTIVE BOX
Date of service: 07/11/22    Requesting Physician : Dr. Porter, Dr. Cook    Reason for Consultation: Syncope       Review of Systems:   Constitutional: [ ] fevers, [ ] chills.   Skin: [ ] dry skin. [ ] rashes.  Psychiatric: [ ] depression, [ ] anxiety.   Gastrointestinal: [ ] BRBPR, [ ] melena.   Neurological: [ ] confusion. [ ] seizures. [ ] shuffling gait.   Ears,Nose,Mouth and Throat: [ ] ear pain [ ] sore throat.   Eyes: [ ] diplopia.   Respiratory: [ ] hemoptysis. [ ] shortness of breath  Cardiovascular: See HPI above  Hematologic/Lymphatic: [ ] anemia. [ ] painful nodes. [ ] prolonged bleeding.   Genitourinary: [ ] hematuria. [ ] flank pain.   Endocrine: [ ] significant change in weight. [ ] intolerance to heat and cold.     Review of systems [x ] otherwise negative, [ ] otherwise unable to obtain    FH: no family history of sudden cardiac death in first degree relatives    SH: [ ] tobacco, [ ] alcohol, [ ] drugs    acetaminophen     Tablet .. 650 milliGRAM(s) Oral every 6 hours PRN  aluminum hydroxide/magnesium hydroxide/simethicone Suspension 30 milliLiter(s) Oral every 4 hours PRN  enoxaparin Injectable 40 milliGRAM(s) SubCutaneous every 24 hours  levothyroxine 75 MICROGram(s) Oral daily  melatonin 3 milliGRAM(s) Oral at bedtime PRN  ondansetron Injectable 4 milliGRAM(s) IV Push every 8 hours PRN                            10.6   17.88 )-----------( 220      ( 11 Jul 2022 06:20 )             31.9       07-11    135  |  103  |  13  ----------------------------<  89  4.0   |  23  |  0.61    Ca    8.3<L>      11 Jul 2022 06:20  Phos  3.3     07-11  Mg     1.80     07-11      T(C): 36.5 (07-11-22 @ 12:00), Max: 36.6 (07-11-22 @ 05:00)  HR: 76 (07-11-22 @ 05:00) (76 - 78)  BP: 104/71 (07-11-22 @ 05:00) (104/71 - 109/79)  RR: 18 (07-11-22 @ 05:00) (18 - 18)  SpO2: 98% (07-11-22 @ 05:00) (98% - 100%)    General: Well nourished in no acute distress. Alert and Oriented * 3.   Head: Normocephalic and atraumatic.   Neck: No JVD. No bruits. Supple. Does not appear to be enlarged.   Cardiovascular: + S1,S2 ; RRR Soft systolic murmur at the left lower sternal border. No rubs noted.    Lungs: CTA b/l. No rhonchi, rales or wheezes.   Abdomen: + BS, soft. Non tender. Non distended. No rebound. No guarding.   Extremities: No clubbing/cyanosis/edema.   Neurologic: Moves all four extremities. Full range of motion.   Skin: Warm and moist. The patient's skin has normal elasticity and good skin turgor.   Psychiatric: Appropriate mood and affect.  Musculoskeletal: Normal range of motion, normal strength      TELEMETRY: SR 	    ECG: SR, narrow qrs 	    ECHO pending    ASSESSMENT/PLAN:  46 year female with history of breast CA on chemotherapy cytox, doxorubicin), hypothyroidism who is being seen for syncope.     -ECG with normal SR with narrow qrs   -monitor on tele  -check orthostatics  -would check TTE to rule out structural heart abnormalities and to evaluate LVEF  -if ECHO WNL, no objection to DC and f/u with Dr Gonzalez in 1-2 weeks in the office [phone]         Date of service: 07/11/22    Requesting Physician : Dr. Porter, Dr. Cook    Reason for Consultation: Syncope       Review of Systems:   Constitutional: [ ] fevers, [ ] chills.   Skin: [ ] dry skin. [ ] rashes.  Psychiatric: [ ] depression, [ ] anxiety.   Gastrointestinal: [ ] BRBPR, [ ] melena.   Neurological: [ ] confusion. [ ] seizures. [ ] shuffling gait.   Ears,Nose,Mouth and Throat: [ ] ear pain [ ] sore throat.   Eyes: [ ] diplopia.   Respiratory: [ ] hemoptysis. [ ] shortness of breath  Cardiovascular: See HPI above  Hematologic/Lymphatic: [ ] anemia. [ ] painful nodes. [ ] prolonged bleeding.   Genitourinary: [ ] hematuria. [ ] flank pain.   Endocrine: [ ] significant change in weight. [ ] intolerance to heat and cold.     Review of systems [x ] otherwise negative, [ ] otherwise unable to obtain    FH: no family history of sudden cardiac death in first degree relatives    SH: [ ] tobacco, [ ] alcohol, [ ] drugs    acetaminophen     Tablet .. 650 milliGRAM(s) Oral every 6 hours PRN  aluminum hydroxide/magnesium hydroxide/simethicone Suspension 30 milliLiter(s) Oral every 4 hours PRN  enoxaparin Injectable 40 milliGRAM(s) SubCutaneous every 24 hours  levothyroxine 75 MICROGram(s) Oral daily  melatonin 3 milliGRAM(s) Oral at bedtime PRN  ondansetron Injectable 4 milliGRAM(s) IV Push every 8 hours PRN                            10.6   17.88 )-----------( 220      ( 11 Jul 2022 06:20 )             31.9       07-11    135  |  103  |  13  ----------------------------<  89  4.0   |  23  |  0.61    Ca    8.3<L>      11 Jul 2022 06:20  Phos  3.3     07-11  Mg     1.80     07-11      T(C): 36.5 (07-11-22 @ 12:00), Max: 36.6 (07-11-22 @ 05:00)  HR: 76 (07-11-22 @ 05:00) (76 - 78)  BP: 104/71 (07-11-22 @ 05:00) (104/71 - 109/79)  RR: 18 (07-11-22 @ 05:00) (18 - 18)  SpO2: 98% (07-11-22 @ 05:00) (98% - 100%)    General: Well nourished in no acute distress. Alert and Oriented * 3.   Head: Normocephalic and atraumatic.   Neck: No JVD. No bruits. Supple. Does not appear to be enlarged.   Cardiovascular: + S1,S2 ; RRR Soft systolic murmur at the left lower sternal border. No rubs noted.    Lungs: CTA b/l. No rhonchi, rales or wheezes.   Abdomen: + BS, soft. Non tender. Non distended. No rebound. No guarding.   Extremities: No clubbing/cyanosis/edema.   Neurologic: Moves all four extremities. Full range of motion.   Skin: Warm and moist. The patient's skin has normal elasticity and good skin turgor.   Psychiatric: Appropriate mood and affect.  Musculoskeletal: Normal range of motion, normal strength      TELEMETRY: SR 	    ECG: SR, narrow qrs 	    ECHO pending    ASSESSMENT/PLAN:  46 year female with history of breast CA on chemotherapy cytox, doxorubicin), hypothyroidism who is being seen for syncope.     -ECG with normal SR with narrow qrs   -no events on tele  -orthostatics negative  -would check TTE to rule out structural heart abnormalities and to evaluate LVEF  -if ECHO WNL, no objection to DC and f/u with Dr Gonzalez in 1-2 weeks in the office [phone] for further OP monitoring

## 2022-07-11 NOTE — PROGRESS NOTE ADULT - NS ATTEND AMEND GEN_ALL_CORE FT
Patient seen and examined.  Agree with above.   Check TTE  If echo normal, recommend outpatient event recorder   Further workup pending above    Vipul Pena MD
Patient seen and examined.  Agree with above.   Follow up TTE  If TTE wnl, no further inpatient cardiac workup needed at this time.     Vipul Pena MD

## 2022-07-11 NOTE — PROGRESS NOTE ADULT - PROBLEM SELECTOR PLAN 2
Patient without fever or localizing signs of infection  Influenza and COVID-19 panels are negative  CXR with clear lungs  UA inconsistent with UTI  BCx NGTD  Suspect this is the result of Neulasta (received 2 days prior to admission)
Patient without fever or localizing signs of infection  Influenza and COVID-19 panels are negative  CXR with clear lungs  UA inconsistent with UTI  BCx NGTD  Suspect this is the result of Neulasta (received 2 days prior to admission)

## 2022-07-11 NOTE — PROGRESS NOTE ADULT - PROBLEM SELECTOR PLAN 6
Patient states she intermittently takes statin based on how she feels  Defer resuming statin to outpatient provider
Patient states she intermittently takes statin based on how she feels  Defer resuming statin to outpatient provider

## 2022-07-11 NOTE — PROGRESS NOTE ADULT - PROBLEM SELECTOR PLAN 1
Patient presents after passing out for about 30 seconds during IVNS infusion at oncologist's office  No post-ictal state, tongue biting, urinary/fecal incontinence, jerking movements during episode; incredibly low suspicion for seizure activity  On my initial assessment, patient had dry oral mucosa; she was likely dehydrated. This is further supported by BP 81/59 on triage, now improved after IVF  No murmur on auscultation, no e/o CHF; patient has unlimited exercise tolerance. Low suspicion for valvular/structural heart disease. Can undergo TTE as outpatient if warranted.  Afebrile and without any localizing sx of infection; BCx w/o growth  Telemetry is without events; patient is NSR, doubt arrhythmia as cause  -d/c IVF  -patient is stable for dc home w/outpatient f/u
Patient presents after passing out for about 30 seconds during IVNS infusion at oncologist's office  No post-ictal state, tongue biting, urinary/fecal incontinence, jerking movements during episode; incredibly low suspicion for seizure activity  patient had dry oral mucosa; she was likely dehydrated. This is further supported by BP 81/59 on triage, now improved after IVF  No murmur on auscultation, no e/o CHF; patient has unlimited exercise tolerance. Low suspicion for valvular/structural heart disease.   Afebrile and without any localizing sx of infection; BCx w/o growth  Telemetry is without events; patient is NSR, doubt arrhythmia as cause  dc after TTE

## 2022-07-11 NOTE — DISCHARGE NOTE PROVIDER - NSDCCPCAREPLAN_GEN_ALL_CORE_FT
PRINCIPAL DISCHARGE DIAGNOSIS  Diagnosis: Syncope  Assessment and Plan of Treatment: You came to the hospital with a syncopal episode during your medication infusion. You were likely dehydrated and you responded well to hydration with fluids through the IV and your blood pressure improved. Your blood pressure is normal and you are being advised to stop NORVASC for now until you repeat your blood pressure at your doctor's office. Your echocardiogram (ultrasound of the heart) was normal. Please follow up with your primary care physician or oncologist for blood pressure check within a few days. Your white blood cells were elvated but this is likely from your Neulasta. Repeat blood work with your oncologist.

## 2022-07-11 NOTE — PROGRESS NOTE ADULT - SUBJECTIVE AND OBJECTIVE BOX
Division of Hospital Medicine  Dr. Angie Juarez  Pager [phone]    Patient is a 46y old  Female who presents with a chief complaint of Syncope (2022 15:08)      SUBJECTIVE / OVERNIGHT EVENTS: No events overnight. This AM, patient without n/v/d/cp/sob.  Wants to go home.     MEDICATIONS  (STANDING):  enoxaparin Injectable 40 milliGRAM(s) SubCutaneous every 24 hours  lactated ringers. 1000 milliLiter(s) (150 mL/Hr) IV Continuous <Continuous>  levothyroxine 75 MICROGram(s) Oral daily    MEDICATIONS  (PRN):  acetaminophen     Tablet .. 650 milliGRAM(s) Oral every 6 hours PRN Temp greater or equal to 38C (100.4F), Mild Pain (1 - 3)  aluminum hydroxide/magnesium hydroxide/simethicone Suspension 30 milliLiter(s) Oral every 4 hours PRN Dyspepsia  melatonin 3 milliGRAM(s) Oral at bedtime PRN Insomnia  ondansetron Injectable 4 milliGRAM(s) IV Push every 8 hours PRN Nausea and/or Vomiting      PHYSICAL EXAM:  Vital Signs Last 24 Hrs  T(C): 36.6 (2022 05:00), Max: 36.7 (10 Jul 2022 13:30)  T(F): 97.9 (2022 05:00), Max: 98 (10 Jul 2022 13:30)  HR: 76 (2022 05:00) (76 - 88)  BP: 104/71 (2022 05:00) (104/71 - 109/79)  BP(mean): --  RR: 18 (2022 05:00) (18 - 18)  SpO2: 98% (2022 05:00) (98% - 100%)      GENERAL: NAD, well-developed  HEAD:  Atraumatic, Normocephalic, MMM  CHEST/LUNG: No use of accessory muscles, CTAB, breathing non-labored  COR: RR, no mrcg; +R chest port without erythema/purulence  ABD: Soft, ND/NT, +BS  PSYCH: AAOx3  NEUROLOGY: CN II-XII grossly intact, moving all extremities  SKIN: No rashes or lesions  EXT: wwp, no cce    LABS:    CAPILLARY BLOOD GLUCOSE      Vital Signs Last 24 Hrs  T(C): 36.6 (2022 05:00), Max: 36.7 (10 Jul 2022 13:30)  T(F): 97.9 (2022 05:00), Max: 98 (10 Jul 2022 13:30)  HR: 76 (2022 05:00) (76 - 88)  BP: 104/71 (2022 05:00) (104/71 - 109/79)  BP(mean): --  RR: 18 (2022 05:00) (18 - 18)  SpO2: 98% (2022 05:00) (98% - 100%)    Parameters below as of 2022 05:00  Patient On (Oxygen Delivery Method): room air            Urinalysis Basic - ( 2022 18:18 )    Color: Light Yellow / Appearance: Slightly Turbid / S.017 / pH: x  Gluc: x / Ketone: Negative  / Bili: Negative / Urobili: <2 mg/dL   Blood: x / Protein: Negative / Nitrite: Negative   Leuk Esterase: Negative / RBC: 2 /HPF / WBC 1 /HPF   Sq Epi: x / Non Sq Epi: 1 /HPF / Bacteria: Negative        Culture - Blood (collected 2022 19:57)  Source: .Blood Blood  Preliminary Report (10 Jul 2022 01:01):    No growth to date.    Culture - Blood (collected 2022 19:40)  Source: .Blood Blood  Preliminary Report (10 Jul 2022 01:01):    No growth to date.        RADIOLOGY & ADDITIONAL TESTS:    Telemetry Personally Reviewed - NSR    Imaging Personally Reviewed -     Imaging Reviewed -     Consultant(s) Notes Reviewed -   onc ,cardio    Care Discussed with Consultants/Other Providers -

## 2022-07-14 LAB
CULTURE RESULTS: SIGNIFICANT CHANGE UP
CULTURE RESULTS: SIGNIFICANT CHANGE UP
SPECIMEN SOURCE: SIGNIFICANT CHANGE UP
SPECIMEN SOURCE: SIGNIFICANT CHANGE UP

## 2022-10-03 ENCOUNTER — APPOINTMENT (OUTPATIENT)
Dept: OBGYN | Facility: CLINIC | Age: 47
End: 2022-10-03

## 2022-11-05 ENCOUNTER — EMERGENCY (EMERGENCY)
Facility: HOSPITAL | Age: 47
LOS: 1 days | Discharge: HOME | End: 2022-11-05
Attending: EMERGENCY MEDICINE | Admitting: EMERGENCY MEDICINE

## 2022-11-05 VITALS
OXYGEN SATURATION: 99 % | TEMPERATURE: 99 F | SYSTOLIC BLOOD PRESSURE: 133 MMHG | DIASTOLIC BLOOD PRESSURE: 81 MMHG | RESPIRATION RATE: 16 BRPM | HEART RATE: 126 BPM

## 2022-11-05 DIAGNOSIS — Z98.891 HISTORY OF UTERINE SCAR FROM PREVIOUS SURGERY: Chronic | ICD-10-CM

## 2022-11-05 DIAGNOSIS — Z98.890 OTHER SPECIFIED POSTPROCEDURAL STATES: Chronic | ICD-10-CM

## 2022-11-05 LAB
ALBUMIN SERPL ELPH-MCNC: 4.5 G/DL — SIGNIFICANT CHANGE UP (ref 3.3–5)
ALP SERPL-CCNC: 74 U/L — SIGNIFICANT CHANGE UP (ref 40–120)
ALT FLD-CCNC: 46 U/L — HIGH (ref 4–33)
ANION GAP SERPL CALC-SCNC: 13 MMOL/L — SIGNIFICANT CHANGE UP (ref 7–14)
APPEARANCE UR: CLEAR — SIGNIFICANT CHANGE UP
APTT BLD: 30.4 SEC — SIGNIFICANT CHANGE UP (ref 27–36.3)
AST SERPL-CCNC: 37 U/L — HIGH (ref 4–32)
BACTERIA # UR AUTO: NEGATIVE — SIGNIFICANT CHANGE UP
BASOPHILS # BLD AUTO: 0.02 K/UL — SIGNIFICANT CHANGE UP (ref 0–0.2)
BASOPHILS NFR BLD AUTO: 0.4 % — SIGNIFICANT CHANGE UP (ref 0–2)
BILIRUB SERPL-MCNC: <0.2 MG/DL — SIGNIFICANT CHANGE UP (ref 0.2–1.2)
BILIRUB UR-MCNC: NEGATIVE — SIGNIFICANT CHANGE UP
BUN SERPL-MCNC: 12 MG/DL — SIGNIFICANT CHANGE UP (ref 7–23)
CALCIUM SERPL-MCNC: 9.7 MG/DL — SIGNIFICANT CHANGE UP (ref 8.4–10.5)
CHLORIDE SERPL-SCNC: 99 MMOL/L — SIGNIFICANT CHANGE UP (ref 98–107)
CO2 SERPL-SCNC: 25 MMOL/L — SIGNIFICANT CHANGE UP (ref 22–31)
COLOR SPEC: COLORLESS — SIGNIFICANT CHANGE UP
CREAT SERPL-MCNC: 0.62 MG/DL — SIGNIFICANT CHANGE UP (ref 0.5–1.3)
D DIMER BLD IA.RAPID-MCNC: <150 NG/ML DDU — SIGNIFICANT CHANGE UP
DIFF PNL FLD: NEGATIVE — SIGNIFICANT CHANGE UP
EGFR: 109 ML/MIN/1.73M2 — SIGNIFICANT CHANGE UP
EOSINOPHIL # BLD AUTO: 0.03 K/UL — SIGNIFICANT CHANGE UP (ref 0–0.5)
EOSINOPHIL NFR BLD AUTO: 0.6 % — SIGNIFICANT CHANGE UP (ref 0–6)
GLUCOSE SERPL-MCNC: 103 MG/DL — HIGH (ref 70–99)
GLUCOSE UR QL: NEGATIVE — SIGNIFICANT CHANGE UP
HCT VFR BLD CALC: 39.1 % — SIGNIFICANT CHANGE UP (ref 34.5–45)
HGB BLD-MCNC: 13 G/DL — SIGNIFICANT CHANGE UP (ref 11.5–15.5)
IANC: 4.51 K/UL — SIGNIFICANT CHANGE UP (ref 1.8–7.4)
IMM GRANULOCYTES NFR BLD AUTO: 0.2 % — SIGNIFICANT CHANGE UP (ref 0–0.9)
INR BLD: 1 RATIO — SIGNIFICANT CHANGE UP (ref 0.88–1.16)
KETONES UR-MCNC: NEGATIVE — SIGNIFICANT CHANGE UP
LEUKOCYTE ESTERASE UR-ACNC: NEGATIVE — SIGNIFICANT CHANGE UP
LYMPHOCYTES # BLD AUTO: 0.5 K/UL — LOW (ref 1–3.3)
LYMPHOCYTES # BLD AUTO: 9.5 % — LOW (ref 13–44)
MAGNESIUM SERPL-MCNC: 2.3 MG/DL — SIGNIFICANT CHANGE UP (ref 1.6–2.6)
MCHC RBC-ENTMCNC: 31.9 PG — SIGNIFICANT CHANGE UP (ref 27–34)
MCHC RBC-ENTMCNC: 33.2 GM/DL — SIGNIFICANT CHANGE UP (ref 32–36)
MCV RBC AUTO: 95.8 FL — SIGNIFICANT CHANGE UP (ref 80–100)
MONOCYTES # BLD AUTO: 0.2 K/UL — SIGNIFICANT CHANGE UP (ref 0–0.9)
MONOCYTES NFR BLD AUTO: 3.8 % — SIGNIFICANT CHANGE UP (ref 2–14)
NEUTROPHILS # BLD AUTO: 4.51 K/UL — SIGNIFICANT CHANGE UP (ref 1.8–7.4)
NEUTROPHILS NFR BLD AUTO: 85.5 % — HIGH (ref 43–77)
NITRITE UR-MCNC: NEGATIVE — SIGNIFICANT CHANGE UP
NRBC # BLD: 0 /100 WBCS — SIGNIFICANT CHANGE UP (ref 0–0)
NRBC # FLD: 0 K/UL — SIGNIFICANT CHANGE UP (ref 0–0)
PH UR: 7 — SIGNIFICANT CHANGE UP (ref 5–8)
PHOSPHATE SERPL-MCNC: 5.3 MG/DL — HIGH (ref 2.5–4.5)
PLATELET # BLD AUTO: 372 K/UL — SIGNIFICANT CHANGE UP (ref 150–400)
POTASSIUM SERPL-MCNC: 4.8 MMOL/L — SIGNIFICANT CHANGE UP (ref 3.5–5.3)
POTASSIUM SERPL-SCNC: 4.8 MMOL/L — SIGNIFICANT CHANGE UP (ref 3.5–5.3)
PROT SERPL-MCNC: 6.9 G/DL — SIGNIFICANT CHANGE UP (ref 6–8.3)
PROT UR-MCNC: NEGATIVE — SIGNIFICANT CHANGE UP
PROTHROM AB SERPL-ACNC: 11.6 SEC — SIGNIFICANT CHANGE UP (ref 10.5–13.4)
RBC # BLD: 4.08 M/UL — SIGNIFICANT CHANGE UP (ref 3.8–5.2)
RBC # FLD: 13.1 % — SIGNIFICANT CHANGE UP (ref 10.3–14.5)
RBC CASTS # UR COMP ASSIST: SIGNIFICANT CHANGE UP /HPF (ref 0–4)
SODIUM SERPL-SCNC: 137 MMOL/L — SIGNIFICANT CHANGE UP (ref 135–145)
SP GR SPEC: 1 — LOW (ref 1.01–1.05)
UROBILINOGEN FLD QL: SIGNIFICANT CHANGE UP
WBC # BLD: 5.27 K/UL — SIGNIFICANT CHANGE UP (ref 3.8–10.5)
WBC # FLD AUTO: 5.27 K/UL — SIGNIFICANT CHANGE UP (ref 3.8–10.5)
WBC UR QL: SIGNIFICANT CHANGE UP /HPF (ref 0–5)

## 2022-11-05 PROCEDURE — 99284 EMERGENCY DEPT VISIT MOD MDM: CPT

## 2022-11-05 PROCEDURE — 71045 X-RAY EXAM CHEST 1 VIEW: CPT | Mod: 26

## 2022-11-05 PROCEDURE — 93010 ELECTROCARDIOGRAM REPORT: CPT

## 2022-11-05 RX ORDER — SODIUM CHLORIDE 9 MG/ML
1000 INJECTION INTRAMUSCULAR; INTRAVENOUS; SUBCUTANEOUS ONCE
Refills: 0 | Status: COMPLETED | OUTPATIENT
Start: 2022-11-05 | End: 2022-11-05

## 2022-11-05 RX ADMIN — SODIUM CHLORIDE 1000 MILLILITER(S): 9 INJECTION INTRAMUSCULAR; INTRAVENOUS; SUBCUTANEOUS at 20:16

## 2022-11-05 RX ADMIN — SODIUM CHLORIDE 1000 MILLILITER(S): 9 INJECTION INTRAMUSCULAR; INTRAVENOUS; SUBCUTANEOUS at 23:45

## 2022-11-05 NOTE — ED PROVIDER NOTE - CLINICAL SUMMARY MEDICAL DECISION MAKING FREE TEXT BOX
46yo F w/ history of breast CA (on chemo, last dose 2 days ago), hypothyroidism, hld coming in w/ palpitations x 1 days; likely secondary to recent flu vaccine vs possible orthostatic secondary to recently presacribed amlodipine? EKG sinus tach at 107 and nonischemic; will evaluate for electrolyte vs infectious etiology and provide IV hydration. Low concern for PE as patient is not tachypneic nor hypoxic. 46yo F w/ history of breast CA (on chemo, last dose 2 days ago), hypothyroidism, hld coming in w/ palpitations x 1 days; likely secondary to recent flu vaccine vs possible orthostatic secondary to recently presacribed amlodipine? EKG sinus tach at 107 and nonischemic; will evaluate for electrolyte vs infectious etiology and provide IV hydration. Low concern for PE as patient is not tachypneic nor hypoxic.    maricarmen: pt with breast ca currently receiving tx, also recently received flu vaccine, comes with palpitations, ecg sinus tach, no short pr, or long qt, giving fluid, will consider ddimer awake and alert and conversant 46yo F w/ history of breast CA (on chemo, last dose 2 days ago), hypothyroidism, hld coming in w/ palpitations x 1 days; likely secondary to recent flu vaccine vs possible orthostatic secondary to recently presacribed amlodipine? EKG sinus tach at 107 and nonischemic; will evaluate for electrolyte vs infectious etiology and provide IV hydration. Low concern for PE as patient is not tachypneic nor hypoxic.    maricarmen: pt with breast ca currently receiving tx, also recently received flu vaccine, comes with palpitations, ecg sinus tach, no short pr, or long qt, giving fluid, will consider ddimer awake and alert and conversant.  d dimer neg but will ct for PE, signed out to Dr. Larsen awaiting ct

## 2022-11-05 NOTE — ED ADULT TRIAGE NOTE - CHIEF COMPLAINT QUOTE
Pt arrives ambulatory to triage c/o palpitations and dizziness x2 days since being started on amlodipine. PMH: breast ca. (last chemo 2 days ago), hypothyroid, fibroids.

## 2022-11-05 NOTE — ED PROVIDER NOTE - PATIENT PORTAL LINK FT
You can access the FollowMyHealth Patient Portal offered by [facility] by registering at the following website: [email or web address]. By joining [employer]’s FollowMyHealth portal, you will also be able to view your health information using other applications (apps) compatible with our system.

## 2022-11-05 NOTE — ED PROVIDER NOTE - NSFOLLOWUPINSTRUCTIONS_ED_ALL_ED_FT
Discharge instructions:    - Please follow up with your Primary Care Doctor within the next 24-72 hours.     - Take any prescribed medications as instructed:       SEEK IMMEDIATE MEDICAL CARE IF YOU HAVE ANY OF THE FOLLOWING SYMPTOMS: chest pain, shortness of breath, severe headache, dizziness/lightheadedness, or fainting.

## 2022-11-05 NOTE — ED PROVIDER NOTE - PROGRESS NOTE DETAILS
Jhonatan, PGY3: Patient reassessed and continues to be tachy to the 106-112. Patient adds additive information; states she feels SOB and sometimes lightheaded when ambulating. Will eval for PE w/ CTA despite negative dimer Jhonatan, PGY3: Patient reassessed and noting symptomatic improvement. HRs in the 80s. Patient feels comfortable being discharged w/ outpatient PMD follow up. Strict return precautions provided and patient understands and agrees w/ plan

## 2022-11-05 NOTE — ED PROVIDER NOTE - PHYSICAL EXAMINATION
GENERAL: well appearing in no acute distress, non-toxic appearing  HEAD: normocephalic, atraumatic  HENT: airway intact  EYES: normal conjunctiva  CARDIAC: tachycardic, normal S1S2, no appreciable murmurs, 2+ pulses in UE/LE b/l  PULM: normal breath sounds, clear to ascultation bilaterally, no rales, rhonchi, wheezing  GI: abdomen nondistended, soft, nontender, no guarding, rebound tenderness  NEURO: no focal motor or sensory deficits  MSK: no peripheral edema  SKIN: well-perfused, extremities warm, no visible rashes

## 2022-11-05 NOTE — ED PROVIDER NOTE - ATTENDING CONTRIBUTION TO CARE
maricarmen: pt with breast ca currently receiving tx, also recently received flu vaccine, comes with palpitations, ecg sinus tach, no short pr, or long qt, giving fluid, will consider ddimer awake and alert and conversant.  d dimer neg but will ct for PE, signed out to Dr. Larsen awaiting ct    I performed a history and physical exam of the patient and discussed their management with the resident and /or advanced care provider. I reviewed the resident and /or ACP's note and agree with the documented findings and plan of care. My medical decison making and observations are found above.

## 2022-11-05 NOTE — ED ADULT NURSE NOTE - OBJECTIVE STATEMENT
received pt in room 8, 47 yr/o female A+OX4, ambulatory at baseline. PMH of left breast CA on chemo (last session on 11/3. pt presented to the ED C/O intermittent episodes of tachy cardia with palpitations for 2 days. pt denies feelings of palpations at this time. pt is tachycardic, MD made aware. pt is afebrile, O2 is >95 on RA. skin is clean dry and intact, un-accessed right chest wall port noted (placed in July as per patient, do not use right extremity), left Ac 20g placed, labs drawn and sent. will continue to monitor.

## 2022-11-05 NOTE — ED PROVIDER NOTE - OBJECTIVE STATEMENT
46yo F w/ history of breast CA (on chemo, last dose 2 days ago), hypothyroidism, hld coming in w/ palpitations x 1 days. Patient notes that she was recently started on amlodipine 2 days ago and was given a flu shot. Since then, she has had intermittent lightheadedness when walking and palpitations. HRs at home has been in the 120s. No chest pain, nausea, vomiting or abdominal pain. Has otherwise been in her normal state of health.

## 2022-11-06 VITALS
OXYGEN SATURATION: 100 % | TEMPERATURE: 98 F | RESPIRATION RATE: 15 BRPM | SYSTOLIC BLOOD PRESSURE: 107 MMHG | HEART RATE: 80 BPM | DIASTOLIC BLOOD PRESSURE: 73 MMHG

## 2022-11-06 PROCEDURE — 71275 CT ANGIOGRAPHY CHEST: CPT | Mod: 26,MA

## 2022-11-06 NOTE — ED ADULT NURSE REASSESSMENT NOTE - NS ED NURSE REASSESS COMMENT FT1
Report received from day RNKae. A&Ox4 and ambulatory. Denies palpitations at this time. Denies CP, SOB, nausea, vomiting, vision changes, dizziness, lightheadedness, fever or chills. Respirations even and unlabored. No apparent distress noted. Pt speaking in full and complete sentences. NSR on bedside cardiac monitor. HR=92. Family at bedside. Bed in lowest position, call bell in reach, wheels locked, side rails up, safety maintained. Awaiting further orders.
Pt appears comfortable laying in bed, with head slightly elevated. Verbalizes improvement in symptoms. Denies CP, SOB, nausea, vomiting, lightheadedness, dizziness, vision changes, fever or chills. Respirations even and unlabored. No apparent distress noted. Pt speaking in full and complete sentences. D/C V/S charted.

## 2022-11-14 ENCOUNTER — EMERGENCY (EMERGENCY)
Facility: HOSPITAL | Age: 47
LOS: 1 days | Discharge: HOME | End: 2022-11-14
Attending: EMERGENCY MEDICINE
Payer: COMMERCIAL

## 2022-11-14 VITALS
SYSTOLIC BLOOD PRESSURE: 160 MMHG | RESPIRATION RATE: 18 BRPM | HEIGHT: 64 IN | WEIGHT: 119.93 LBS | HEART RATE: 115 BPM | OXYGEN SATURATION: 98 % | DIASTOLIC BLOOD PRESSURE: 100 MMHG

## 2022-11-14 DIAGNOSIS — Z98.890 OTHER SPECIFIED POSTPROCEDURAL STATES: Chronic | ICD-10-CM

## 2022-11-14 DIAGNOSIS — Z98.891 HISTORY OF UTERINE SCAR FROM PREVIOUS SURGERY: Chronic | ICD-10-CM

## 2022-11-14 LAB
ALBUMIN SERPL ELPH-MCNC: 4.4 G/DL — SIGNIFICANT CHANGE UP (ref 3.3–5)
ALP SERPL-CCNC: 73 U/L — SIGNIFICANT CHANGE UP (ref 40–120)
ALT FLD-CCNC: 36 U/L — SIGNIFICANT CHANGE UP (ref 10–45)
ANION GAP SERPL CALC-SCNC: 13 MMOL/L — SIGNIFICANT CHANGE UP (ref 5–17)
APPEARANCE UR: CLEAR — SIGNIFICANT CHANGE UP
AST SERPL-CCNC: 30 U/L — SIGNIFICANT CHANGE UP (ref 10–40)
BACTERIA # UR AUTO: NEGATIVE — SIGNIFICANT CHANGE UP
BASE EXCESS BLDV CALC-SCNC: -1.3 MMOL/L — SIGNIFICANT CHANGE UP (ref -2–3)
BASOPHILS # BLD AUTO: 0.03 K/UL — SIGNIFICANT CHANGE UP (ref 0–0.2)
BASOPHILS NFR BLD AUTO: 0.3 % — SIGNIFICANT CHANGE UP (ref 0–2)
BILIRUB SERPL-MCNC: 0.3 MG/DL — SIGNIFICANT CHANGE UP (ref 0.2–1.2)
BILIRUB UR-MCNC: NEGATIVE — SIGNIFICANT CHANGE UP
BLOOD GAS VENOUS - CREATININE: SIGNIFICANT CHANGE UP MG/DL (ref 0.5–1.3)
BUN SERPL-MCNC: 10 MG/DL — SIGNIFICANT CHANGE UP (ref 7–23)
CA-I SERPL-SCNC: 1.24 MMOL/L — SIGNIFICANT CHANGE UP (ref 1.15–1.33)
CALCIUM SERPL-MCNC: 9.8 MG/DL — SIGNIFICANT CHANGE UP (ref 8.4–10.5)
CHLORIDE BLDV-SCNC: 102 MMOL/L — SIGNIFICANT CHANGE UP (ref 96–108)
CHLORIDE SERPL-SCNC: 100 MMOL/L — SIGNIFICANT CHANGE UP (ref 96–108)
CO2 BLDV-SCNC: 26 MMOL/L — SIGNIFICANT CHANGE UP (ref 22–26)
CO2 SERPL-SCNC: 23 MMOL/L — SIGNIFICANT CHANGE UP (ref 22–31)
COLOR SPEC: COLORLESS — SIGNIFICANT CHANGE UP
CREAT SERPL-MCNC: 0.56 MG/DL — SIGNIFICANT CHANGE UP (ref 0.5–1.3)
DIFF PNL FLD: NEGATIVE — SIGNIFICANT CHANGE UP
EGFR: 112 ML/MIN/1.73M2 — SIGNIFICANT CHANGE UP
EOSINOPHIL # BLD AUTO: 0.08 K/UL — SIGNIFICANT CHANGE UP (ref 0–0.5)
EOSINOPHIL NFR BLD AUTO: 0.9 % — SIGNIFICANT CHANGE UP (ref 0–6)
EPI CELLS # UR: 0 /HPF — SIGNIFICANT CHANGE UP
GAS PNL BLDV: 134 MMOL/L — LOW (ref 136–145)
GAS PNL BLDV: SIGNIFICANT CHANGE UP
GAS PNL BLDV: SIGNIFICANT CHANGE UP
GLUCOSE BLDV-MCNC: 86 MG/DL — SIGNIFICANT CHANGE UP (ref 70–99)
GLUCOSE SERPL-MCNC: 98 MG/DL — SIGNIFICANT CHANGE UP (ref 70–99)
GLUCOSE UR QL: NEGATIVE — SIGNIFICANT CHANGE UP
HCO3 BLDV-SCNC: 25 MMOL/L — SIGNIFICANT CHANGE UP (ref 22–29)
HCT VFR BLD CALC: 37.9 % — SIGNIFICANT CHANGE UP (ref 34.5–45)
HCT VFR BLDA CALC: 37 % — SIGNIFICANT CHANGE UP (ref 34.5–46.5)
HGB BLD CALC-MCNC: 12.3 G/DL — SIGNIFICANT CHANGE UP (ref 11.7–16.1)
HGB BLD-MCNC: 13 G/DL — SIGNIFICANT CHANGE UP (ref 11.5–15.5)
IMM GRANULOCYTES NFR BLD AUTO: 0.4 % — SIGNIFICANT CHANGE UP (ref 0–0.9)
KETONES UR-MCNC: NEGATIVE — SIGNIFICANT CHANGE UP
LACTATE BLDV-MCNC: 1.6 MMOL/L — SIGNIFICANT CHANGE UP (ref 0.5–2)
LEUKOCYTE ESTERASE UR-ACNC: NEGATIVE — SIGNIFICANT CHANGE UP
LYMPHOCYTES # BLD AUTO: 0.73 K/UL — LOW (ref 1–3.3)
LYMPHOCYTES # BLD AUTO: 8.2 % — LOW (ref 13–44)
MCHC RBC-ENTMCNC: 32.3 PG — SIGNIFICANT CHANGE UP (ref 27–34)
MCHC RBC-ENTMCNC: 34.3 GM/DL — SIGNIFICANT CHANGE UP (ref 32–36)
MCV RBC AUTO: 94.3 FL — SIGNIFICANT CHANGE UP (ref 80–100)
MONOCYTES # BLD AUTO: 0.83 K/UL — SIGNIFICANT CHANGE UP (ref 0–0.9)
MONOCYTES NFR BLD AUTO: 9.3 % — SIGNIFICANT CHANGE UP (ref 2–14)
NEUTROPHILS # BLD AUTO: 7.18 K/UL — SIGNIFICANT CHANGE UP (ref 1.8–7.4)
NEUTROPHILS NFR BLD AUTO: 80.9 % — HIGH (ref 43–77)
NITRITE UR-MCNC: NEGATIVE — SIGNIFICANT CHANGE UP
NRBC # BLD: 0 /100 WBCS — SIGNIFICANT CHANGE UP (ref 0–0)
PCO2 BLDV: 46 MMHG — HIGH (ref 39–42)
PH BLDV: 7.34 — SIGNIFICANT CHANGE UP (ref 7.32–7.43)
PH UR: 7 — SIGNIFICANT CHANGE UP (ref 5–8)
PLATELET # BLD AUTO: 344 K/UL — SIGNIFICANT CHANGE UP (ref 150–400)
PO2 BLDV: 35 MMHG — SIGNIFICANT CHANGE UP (ref 25–45)
POTASSIUM BLDV-SCNC: 3.7 MMOL/L — SIGNIFICANT CHANGE UP (ref 3.5–5.1)
POTASSIUM SERPL-MCNC: 3.9 MMOL/L — SIGNIFICANT CHANGE UP (ref 3.5–5.3)
POTASSIUM SERPL-SCNC: 3.9 MMOL/L — SIGNIFICANT CHANGE UP (ref 3.5–5.3)
PROT SERPL-MCNC: 7.1 G/DL — SIGNIFICANT CHANGE UP (ref 6–8.3)
PROT UR-MCNC: NEGATIVE — SIGNIFICANT CHANGE UP
RBC # BLD: 4.02 M/UL — SIGNIFICANT CHANGE UP (ref 3.8–5.2)
RBC # FLD: 13 % — SIGNIFICANT CHANGE UP (ref 10.3–14.5)
RBC CASTS # UR COMP ASSIST: 0 /HPF — SIGNIFICANT CHANGE UP (ref 0–4)
SAO2 % BLDV: 54.2 % — LOW (ref 67–88)
SARS-COV-2 RNA SPEC QL NAA+PROBE: SIGNIFICANT CHANGE UP
SODIUM SERPL-SCNC: 136 MMOL/L — SIGNIFICANT CHANGE UP (ref 135–145)
SP GR SPEC: 1.01 — LOW (ref 1.01–1.02)
TROPONIN T, HIGH SENSITIVITY RESULT: 13 NG/L — SIGNIFICANT CHANGE UP (ref 0–51)
TROPONIN T, HIGH SENSITIVITY RESULT: 15 NG/L — SIGNIFICANT CHANGE UP (ref 0–51)
UROBILINOGEN FLD QL: NEGATIVE — SIGNIFICANT CHANGE UP
WBC # BLD: 8.89 K/UL — SIGNIFICANT CHANGE UP (ref 3.8–10.5)
WBC # FLD AUTO: 8.89 K/UL — SIGNIFICANT CHANGE UP (ref 3.8–10.5)
WBC UR QL: 0 /HPF — SIGNIFICANT CHANGE UP (ref 0–5)

## 2022-11-14 PROCEDURE — 71046 X-RAY EXAM CHEST 2 VIEWS: CPT | Mod: 26

## 2022-11-14 PROCEDURE — 70498 CT ANGIOGRAPHY NECK: CPT | Mod: 26,MA

## 2022-11-14 PROCEDURE — 71275 CT ANGIOGRAPHY CHEST: CPT | Mod: 26,MD

## 2022-11-14 PROCEDURE — 70496 CT ANGIOGRAPHY HEAD: CPT | Mod: 26,MA

## 2022-11-14 PROCEDURE — 99220: CPT

## 2022-11-14 RX ORDER — SODIUM CHLORIDE 9 MG/ML
1000 INJECTION, SOLUTION INTRAVENOUS ONCE
Refills: 0 | Status: DISCONTINUED | OUTPATIENT
Start: 2022-11-14 | End: 2022-11-14

## 2022-11-14 RX ORDER — SODIUM CHLORIDE 9 MG/ML
1000 INJECTION INTRAMUSCULAR; INTRAVENOUS; SUBCUTANEOUS ONCE
Refills: 0 | Status: COMPLETED | OUTPATIENT
Start: 2022-11-14 | End: 2022-11-14

## 2022-11-14 RX ORDER — SODIUM CHLORIDE 9 MG/ML
1000 INJECTION INTRAMUSCULAR; INTRAVENOUS; SUBCUTANEOUS
Refills: 0 | Status: DISCONTINUED | OUTPATIENT
Start: 2022-11-14 | End: 2022-11-15

## 2022-11-14 RX ADMIN — SODIUM CHLORIDE 1000 MILLILITER(S): 9 INJECTION INTRAMUSCULAR; INTRAVENOUS; SUBCUTANEOUS at 16:22

## 2022-11-14 RX ADMIN — SODIUM CHLORIDE 1000 MILLILITER(S): 9 INJECTION INTRAMUSCULAR; INTRAVENOUS; SUBCUTANEOUS at 14:41

## 2022-11-14 NOTE — ED PROVIDER NOTE - PROGRESS NOTE DETAILS
Melquiades Perea PGY2: Patient remains tachycardic despite IVF, pt uncomfortble, expressing anxiety and requesting to stay in hospital. Possible cardiotoxicity given chemotherapy and exertional toxicity, consider CDU/TTE/Cards. Given persistent tachycardia will eval w/CTA, if negative CDU for cardiologic eval. - Micha Valentin, PGY-2 CTA negative, spoke w/covering service for Dr. Luis Gonzalez (covering service # [phone]). Dr. Riley from team to see pt in am, will dispo to CDU at this time. - Micha Valentin, PGY-2 Given persistent tachycardia will eval w/CTA, if negative CDU for cardiologic eval. - Micha Valentin, PGY-2.

## 2022-11-14 NOTE — ED CDU PROVIDER INITIAL DAY NOTE - NS ED ATTENDING STATEMENT MOD
This was a shared visit with the SUJIT. I reviewed and verified the documentation and independently performed the documented:

## 2022-11-14 NOTE — ED CDU PROVIDER INITIAL DAY NOTE - MEDICAL DECISION MAKING DETAILS
Cheng Leigh MD:  47F w/ h/o breast cancer on chemo, HTN, HLD, Hypothyroid, recent visit at [facility] with workup for exertional SOB and palpitations incl CTA neg for PE, presents with exertional dyspnea. CT angio head and neck negative for acute intracranial pathology. Given persistent tachycardia, CTPA was order and found to be negative and patient placed on observation for cardiology eval.

## 2022-11-14 NOTE — ED PROVIDER NOTE - CLINICAL SUMMARY MEDICAL DECISION MAKING FREE TEXT BOX
syed - pt breast ca on chemo - with recent admission for exertional dyspnea at [facility] ed - cta neg- for pe -- no trop sent -  ekg and biomarkers r/o angina - ctahead and neck to exclude- post circ dysfunction -- iv fluids and reeval

## 2022-11-14 NOTE — ED PROVIDER NOTE - NSICDXPASTMEDICALHX_GEN_ALL_CORE_FT
PAST MEDICAL HISTORY:  Breast cancer     HLD (hyperlipidemia)     HTN (hypertension)     Hypothyroid

## 2022-11-14 NOTE — ED CDU PROVIDER INITIAL DAY NOTE - OBJECTIVE STATEMENT
47F w/ h/o breast cancer on chemo, HTN, HLD, Hypothyroid, recent visit at [facility] with workup for exertional SOB and palpitations incl CTA neg for PE, presents with exertional dyspnea, R sided chest pressure that radiates to R back and head and neck, and dizziness. Patient describes exacerbation of her symptoms with exertion, and describes symptoms as persistent but not worsened. Denies fever/chills, abd pain, N/V, dysuria. Workup in ED nonactionable. Patient to be placed in CDU for cards consult, ECHO and tele monitoring.

## 2022-11-14 NOTE — ED ADULT NURSE REASSESSMENT NOTE - NS ED NURSE REASSESS COMMENT FT1
Report taken from KEVIN Lagunas. Pt is A&O x 4. Breathing even and unlabored. States "I feel a little better after the fluids. My abdominal discomfort is kind of the same." Remains sinus tach on CM. Denies needs at this time. Call bell within reach. Family at bedside. Awaiting dispo. Report taken from KEVIN Lagunas. Pt is A&O x 4. Breathing even and unlabored. States "I feel a little better after the fluids. My back and chest pain is kind of the same." Remains sinus tach on CM. Dr. Perea aware. Denies needs at this time. Call bell within reach. Family at bedside. Awaiting dispo.

## 2022-11-14 NOTE — ED PROVIDER NOTE - PATIENT PORTAL LINK FT
You can access the FollowMyHealth Patient Portal offered by [facility] by registering at the following website: [email or web address]. By joining [employer]’s FollowMyHealth portal, you will also be able to view your health information using other applications (apps) compatible with our system.

## 2022-11-14 NOTE — ED CDU PROVIDER DISPOSITION NOTE - NSFOLLOWUPINSTRUCTIONS_ED_ALL_ED_FT
1. Follow up with your PCP within 2-3 days. Follow up with your cardiologist within 2-3 days. Follow up with your oncologist within 2-3 days.   2. Continue all home medications.   3. Drink plenty of fluids.   4. Return to the emergency department if you develop worsening pain, difficulty breathing, fevers, vomiting, or any other concerning symptoms. 1. Follow up with your cardiologist within 2-3 days. Follow up with your cardiologist within 2-3 days. Follow up with your oncologist tomorrow as scheduled.   2. Continue all home medications.   3. Drink plenty of fluids.   4. Return to the emergency department if you develop worsening pain, difficulty breathing, fevers, vomiting, or any other concerning symptoms.

## 2022-11-14 NOTE — ED CDU PROVIDER INITIAL DAY NOTE - DETAILS
48 y/o with chest/back pain, palpitations   - tele  -ECHO  - cards to see  - hydration   -Reassessment   -Discussed with ED team

## 2022-11-14 NOTE — ED PROVIDER NOTE - PHYSICAL EXAMINATION
GENERAL: well appearing in no acute distress, non-toxic appearing  HEAD: normocephalic, atraumatic  HEENT: normal conjunctiva, oral mucosa dry, no JVD,   CARDIAC: tachycardic, normal S1S2, no appreciable murmurs, 2+ pulses in UE/LE b/l  PULM: normal breath sounds, clear to ascultation bilaterally, no rales, rhonchi, wheezing  NEURO: normal speech, AAOx3  MSK: ROM intact, no peripheral edema, no calf tenderness b/l, no c-spine tenderness,   SKIN: well-perfused, extremities warm, no visible rashes  PSYCH: appropriate mood and affect

## 2022-11-14 NOTE — ED ADULT NURSE NOTE - PAIN: BODY LOCATION
aches Mustarde Flap Text: The defect edges were debeveled with a #15 scalpel blade.  Given the size, depth and location of the defect and the proximity to free margins a Mustarde flap was deemed most appropriate.  Using a sterile surgical marker, an appropriate flap was drawn incorporating the defect. The area thus outlined was incised with a #15 scalpel blade.  The skin margins were undermined to an appropriate distance in all directions utilizing iris scissors.

## 2022-11-14 NOTE — ED CDU PROVIDER DISPOSITION NOTE - CLINICAL COURSE
47F w/ h/o breast cancer on chemo, HTN, HLD, Hypothyroid, recent visit at [facility] with workup for exertional SOB and palpitations incl CTA neg for PE, presents with exertional dyspnea, R sided chest pressure that radiates to R back and head and neck, and dizziness. Patient describes exacerbation of her symptoms with exertion, and describes symptoms as persistent but not worsened. Denies fever/chills, abd pain, N/V, dysuria. Workup in ED nonactionable. Patient to be placed in CDU for cards consult, ECHO and tele monitoring.     While in CDU ____ 47F w/ h/o breast cancer on chemo, HTN, HLD, Hypothyroid, recent visit at [facility] with workup for exertional SOB and palpitations incl CTA neg for PE, presents with exertional dyspnea, R sided chest pressure that radiates to R back and head and neck, and dizziness. Patient describes exacerbation of her symptoms with exertion, and describes symptoms as persistent but not worsened. Denies fever/chills, abd pain, N/V, dysuria. Workup in ED nonactionable. Patient to be placed in CDU for cards consult, ECHO and tele monitoring.      Pt with improvement in symptoms, denies sob, dizziness, lightheadedness or discomfort at rest or with exertion. no events on tele, HR in high 90s, echo WNL, and seen by Dr. Riley who believes symptoms are not cardiac. Pt agreeable for d/c and close follow up with her oncologist and her cardiologist, all questions answered, d/w Dr. Artis.

## 2022-11-14 NOTE — ED ADULT NURSE NOTE - OBJECTIVE STATEMENT
pt is on chemotherapy and is here c/o dehydration and hypertension.  she is alert and active   appears well  placed on CRM  also c/o chest  tighness but no SOB

## 2022-11-14 NOTE — ED CDU PROVIDER DISPOSITION NOTE - PATIENT PORTAL LINK FT
You can access the FollowMyHealth Patient Portal offered by [facility] by registering at the following website: [email or web address]. By joining [employer]’s FollowMyHealth portal, you will also be able to view your health information using other applications (apps) compatible with our system.

## 2022-11-14 NOTE — ED PROVIDER NOTE - CROS ED GI ALL NEG
"SUBJECTIVE:   Audrey Ortega is a 82 year old female who presents for Preventive Visit.      Are you in the first 12 months of your Medicare coverage?  No    Healthy Habits:    In general, how would you rate your overall health?  Good    Frequency of exercise:  6-7 days/week    Duration of exercise:  15-30 minutes    Do you usually eat at least 4 servings of fruit and vegetables a day, include whole grains    & fiber and avoid regularly eating high fat or \"junk\" foods?  No    Taking medications regularly:  Yes    Barriers to taking medications:  Not applicable    Medication side effects:  None    Ability to successfully perform activities of daily living:  No assistance needed    Home Safety:  No safety concerns identified    Hearing Impairment:  No hearing concerns    In the past 6 months, have you been bothered by leaking of urine?  No    In general, how would you rate your overall mental or emotional health?  Good      PHQ-2 Total Score:    Additional concerns today:  Yes (diarrhea)    Do you feel safe in your environment? Yes    Have you ever done Advance Care Planning? (For example, a Health Directive, POLST, or a discussion with a medical provider or your loved ones about your wishes): Yes, patient states has an Advance Care Planning document and will bring a copy to the clinic.      Fall risk  Fallen 2 or more times in the past year?: No  Any fall with injury in the past year?: No    Cognitive Screening   1) Repeat 3 items (Leader, Season, Table)    2) Clock draw: ABNORMAL patient at baseline  3) 3 item recall: Recalls 2 objects   Results: ABNORMAL clock, 1-2 items recalled: PROBABLE COGNITIVE IMPAIRMENT, **INFORM PROVIDER**    Mini-CogTM Copyright STEFANIA Cabello. Licensed by the author for use in [facility]; reprinted with permission (jatinder@.[facility]). All rights reserved.      Do you have sleep apnea, excessive snoring or daytime drowsiness?: no    Reviewed and updated as needed this visit by clinical " staff  Tobacco  Allergies  Meds  Med Hx  Surg Hx  Fam Hx  Soc Hx        Reviewed and updated as needed this visit by Provider        Social History     Tobacco Use     Smoking status: Never Smoker     Smokeless tobacco: Never Used   Substance Use Topics     Alcohol use: Not on file         No flowsheet data found.            Current providers sharing in care for this patient include:   Patient Care Team:  Anatoly Shepard NP as PCP - General (Nurse Practitioner)  Anatoly Shepard NP as Assigned PCP    The following health maintenance items are reviewed in Epic and correct as of today:  Health Maintenance   Topic Date Due     DEXA  1938     ADVANCE CARE PLANNING  1938     ZOSTER IMMUNIZATION (1 of 2) 03/19/1986     MEDICARE ANNUAL WELLNESS VISIT  03/19/2001     INFLUENZA VACCINE (1) 09/01/2019     PHQ-9  12/26/2019     PNEUMOCOCCAL IMMUNIZATION 65+ LOW/MEDIUM RISK (2 of 2 - PPSV23) 03/07/2020     FALL RISK ASSESSMENT  12/27/2020     DTAP/TDAP/TD IMMUNIZATION (2 - Td) 12/27/2029     IPV IMMUNIZATION  Aged Out     MENINGITIS IMMUNIZATION  Aged Out     Lab work is in process  Labs reviewed in EPIC  BP Readings from Last 3 Encounters:   04/20/20 128/76   04/15/20 125/74   01/15/20 130/74    Wt Readings from Last 3 Encounters:   04/20/20 58.1 kg (128 lb)   01/15/20 58 kg (127 lb 12.8 oz)   12/27/19 57.8 kg (127 lb 8 oz)                  There is no problem list on file for this patient.    History reviewed. No pertinent surgical history.    Social History     Tobacco Use     Smoking status: Never Smoker     Smokeless tobacco: Never Used   Substance Use Topics     Alcohol use: Not on file     History reviewed. No pertinent family history.      Current Outpatient Medications   Medication Sig Dispense Refill     amoxicillin-clavulanate (AUGMENTIN) 875-125 MG tablet Take 1 tablet by mouth 2 times daily for 7 days 14 tablet 0     aspirin (ASA) 325 MG EC tablet Take 325 mg by mouth        lisinopril-hydrochlorothiazide (ZESTORETIC) 20-25 MG tablet Take 1 tablet by mouth daily 90 tablet 3     zolpidem (AMBIEN) 5 MG tablet Take 2.5 mg as needed for sleep. 15 tablet 0     No Known Allergies      Review of Systems  Constitutional, HEENT, cardiovascular, pulmonary, gi and gu systems are negative, except as otherwise noted.    Patient has a history of diverticulitis, has been under a lot more stress secondary to the COVID restrictions, started with liquid diarrhea about 3 weeks ago, occurs with drinking and eating, a lot like the last exacerbation of her diverticulitis in December 2019.  She stopped taking her metoprolol as blood pressure dropped, and systolic pressure was under 100 and diastolic pressure was under 70 this made her very dizzy, she is currently just taking Zestoretic 20-to 25 mg tablets daily, blood pressures well within goal range with the systolic being in the 120s and diastolics been in the 70s.     She is working with Cardiology and is taking the metoprolol for the Afib. They are calling her in this regard to determine if and when they need to restart the metoprolol.      She states after 3 weeks of diarrhea she is starting to feel more weak but she is drinking lots of fluids to include Gatorade which is helping.  No blood in the stool, she is having some urinary urgency and hesitancy, no flank pain wondering if she is developing a urinary tract infection.  She did initially state that she had some streaking of blood in the stool the first couple days but no blood since.  She is negative for fevers or chills.  No abdominal pain or cramping with the diarrhea she just gets the loose stools following eating or drinking.  No new rashes or bruising.  No new swelling in any kind.  No new bony pain or drenching night sweats.  No significant new depression, she is using the Ambien 2.5 mg to help her sleep this is important right now and during this time of isolation she does have a time ago  "time sleeping.  Anxiety is worse secondary to the COVID restrictions of not being able to see family but not to the point that she is looking for any medication.  Patient has been having some worsening left shoulder pain, this is been chronic for her now worsening, she does not want to pursue any orthopedic treatment at this time but does not improve over the next few weeks she will call clinic and I will refer her to sports medicine, no other new symptoms of concern.    OBJECTIVE:   /76 (Cuff Size: Adult Regular)   Pulse 84   Temp 97.1  F (36.2  C) (Temporal)   Resp 16   Wt 58.1 kg (128 lb)   BMI 21.30 kg/m   Estimated body mass index is 21.3 kg/m  as calculated from the following:    Height as of 1/15/20: 1.651 m (5' 5\").    Weight as of this encounter: 58.1 kg (128 lb).  Physical Exam  GENERAL APPEARANCE: healthy, alert and no distress  EYES: Eyes grossly normal to inspection, PERRL and conjunctivae and sclerae normal  HENT: ear canals and TM's normal, nose and mouth without ulcers or lesions, oropharynx clear and oral mucous membranes moist  NECK: no adenopathy, no asymmetry, masses, or scars and thyroid normal to palpation  RESP: lungs clear to auscultation - no rales, rhonchi or wheezes  BREAST: no palpable axillary masses or adenopathy  CV: regular rate and rhythm, normal S1 S2, no S3 or S4, no murmur, click or rub, no peripheral edema and peripheral pulses strong  ABDOMEN: soft, nontender, no hepatosplenomegaly, no masses and bowel sounds normal  MS: no musculoskeletal defects are noted and gait is age appropriate without ataxia  SKIN: no suspicious lesions or rashes  NEURO: Normal strength and tone, sensory exam grossly normal, mentation intact and speech normal  PSYCH: mentation appears normal and affect normal/bright    Diagnostic Test Results:  Labs reviewed in Epic    ASSESSMENT / PLAN:   1. Primary insomnia  -This is working for her we will continue with current plan of care.  - zolpidem " (AMBIEN) 5 MG tablet; Take 2.5 mg as needed for sleep.  Dispense: 15 tablet; Refill: 0    2. Diarrhea, unspecified type  -I highly suspect this is another exacerbation of the diverticulitis.  Augmentin worked well for her in the past we will put her on a 7-day course of this this is been going on for 3 weeks now.  Due to the weakness I am to get some labs on her make sure her hemoglobin has not dropped checked her sodium and electrolytes as well and getting an occult stool.  Patient is in agreement with this  - CBC with platelets  - **Comprehensive metabolic panel FUTURE anytime; Future  - **Basic metabolic panel FUTURE anytime; Future  - **Comprehensive metabolic panel FUTURE anytime    3. Benign essential hypertension  -We will continue with the Zestoretic, blood pressure is well within range, at this point cardiology will be the ones to determine if and when to restart the Metoprolol.   I appreciate her continuing to follow up with cardiology. Again she will have a video visit with them today at 3 pm.   - lisinopril-hydrochlorothiazide (ZESTORETIC) 20-25 MG tablet; Take 1 tablet by mouth daily  Dispense: 90 tablet; Refill: 3    4. Urgency incontinence  -I do want her  for a UA as she having more urgency and hesitancy and weakness, patient is in agreement with this.  - *UA reflex to Microscopic and Culture ([place]; [facility]; [facility]; [facility]; [facility]; [facility]; [place]; [place])    5. Diverticulitis of colon  - As stated above I do believe she is having a flare of the diverticulitis we will treat with the Augmentin enema doing occult stool to make sure she is not having any excess blood in the stool that she is not recognizing  - Occult blood stool 1-3 spec; Future  - amoxicillin-clavulanate (AUGMENTIN) 875-125 MG tablet; Take 1 tablet by mouth 2 times daily for 7 days  Dispense: 14 tablet; Refill: 0    6. Generalized muscle weakness  -I will be checking for blood in the  "stool, checking the electrolytes as well as checking her hemoglobin for any specific etiology other than the diverticulitis exacerbation.  - Occult blood stool 1-3 spec; Future    7. Encounter for screening for malignant neoplasm of intestinal tract, unspecified   -Wanting to rule out any significant blood loss that could be related to a malignant neoplasm of the intestinal tract she is a greater risk secondary to her diverticulitis.  - Occult blood stool 1-3 spec; Future    COUNSELING:  Reviewed preventive health counseling, as reflected in patient instructions  Special attention given to:       Fall risk prevention       Osteoporosis Prevention/Bone Health    Estimated body mass index is 21.3 kg/m  as calculated from the following:    Height as of 1/15/20: 1.651 m (5' 5\").    Weight as of this encounter: 58.1 kg (128 lb).         reports that she has never smoked. She has never used smokeless tobacco.      Appropriate preventive services were discussed with this patient, including applicable screening as appropriate for cardiovascular disease, diabetes, osteopenia/osteoporosis, and glaucoma.  As appropriate for age/gender, discussed screening for colorectal cancer, prostate cancer, breast cancer, and cervical cancer. Checklist reviewing preventive services available has been given to the patient.    Reviewed patients plan of care and provided an AVS. The Basic Care Plan (routine screening as documented in Health Maintenance) for Audrey meets the Care Plan requirement. This Care Plan has been established and reviewed with the Patient continued to all you know what the goal of this is significant.  He saw Jeffery العلي quick doses were no big deal that is probably why know he was supportive of it and just like.    Counseling Resources:  ATP IV Guidelines  Pooled Cohorts Equation Calculator  Breast Cancer Risk Calculator  FRAX Risk Assessment  ICSI Preventive Guidelines  Dietary Guidelines for Americans, 2010  USDA's " MyPlate  ASA Prophylaxis  Lung CA Screening    Anatoly Shepard NP  [facility]    Identified Health Risks:    The patient was counseled and encouraged to consider modifying their diet and eating habits. She was provided with information on recommended healthy diet options.    ADDENDUM:    Results for orders placed or performed in visit on 04/20/20   CBC with platelets     Status: None   Result Value Ref Range    WBC 7.7 4.0 - 11.0 10e9/L    RBC Count 4.25 3.8 - 5.2 10e12/L    Hemoglobin 13.6 11.7 - 15.7 g/dL    Hematocrit 39.1 35.0 - 47.0 %    MCV 92 78 - 100 fl    MCH 32.0 26.5 - 33.0 pg    MCHC 34.8 31.5 - 36.5 g/dL    RDW 11.7 10.0 - 15.0 %    Platelet Count 250 150 - 450 10e9/L   *UA reflex to Microscopic and Culture ([place]; [facility]; [facility]; [facility]; [facility]; [facility]; [place]; [place])     Status: Abnormal    Specimen: Unspecified Urine   Result Value Ref Range    Color Urine Yellow     Appearance Urine Clear     Glucose Urine Negative NEG^Negative mg/dL    Bilirubin Urine Negative NEG^Negative    Ketones Urine Negative NEG^Negative mg/dL    Specific Gravity Urine 1.011 1.003 - 1.035    Blood Urine Negative NEG^Negative    pH Urine 6.0 5.0 - 7.0 pH    Protein Albumin Urine Negative NEG^Negative mg/dL    Urobilinogen mg/dL 0.0 0.0 - 2.0 mg/dL    Nitrite Urine Negative NEG^Negative    Leukocyte Esterase Urine Moderate (A) NEG^Negative    Source Unspecified Urine     RBC Urine <1 0 - 2 /HPF    WBC Urine 2 0 - 5 /HPF    Squamous Epithelial /HPF Urine 2 (H) 0 - 1 /HPF    Mucous Urine Present (A) NEG^Negative /LPF    Hyaline Casts 6 (H) 0 - 2 /LPF   **Comprehensive metabolic panel FUTURE anytime     Status: Abnormal   Result Value Ref Range    Sodium 126 (L) 133 - 144 mmol/L    Potassium 3.8 3.4 - 5.3 mmol/L    Chloride 91 (L) 94 - 109 mmol/L    Carbon Dioxide 28 20 - 32 mmol/L    Anion Gap 7 3 - 14 mmol/L    Glucose 95 70 - 99 mg/dL    Urea Nitrogen 25 7 - 30  mg/dL    Creatinine 1.07 (H) 0.52 - 1.04 mg/dL    GFR Estimate 48 (L) >60 mL/min/[1.73_m2]    GFR Estimate If Black 55 (L) >60 mL/min/[1.73_m2]    Calcium 9.5 8.5 - 10.1 mg/dL    Bilirubin Total 0.7 0.2 - 1.3 mg/dL    Albumin 3.7 3.4 - 5.0 g/dL    Protein Total 7.9 6.8 - 8.8 g/dL    Alkaline Phosphatase 62 40 - 150 U/L    ALT 20 0 - 50 U/L    AST 18 0 - 45 U/L     She is hyponatremic. I discussed the need for her to increase the sodium in her diet and decrease the straight water intake. I am wondering if we should stop the hydrochlorothiazide, she will discuss with cardiology this afternoon.     Plan will be to repeat the bmp next week. I will get her scheduled for this.     She has elevated Leukocytes and esterase, but no Nitrates, or elevated WBC in the UA, if she becomes more symptomatic I will recheck this next week as well.     Patient was called with results and we discussed plan of care.     She was instructed to present to ER with any worsening weakness or if the diarrhea gets worse.     Patient verbalized understanding of plan of care.     Anatoly Shepard CNP       negative...

## 2022-11-14 NOTE — ED PROVIDER NOTE - OBJECTIVE STATEMENT
47F w/ h/o breast cancer on chemo, HTN, HLD, Hypothyroid, recent visit at [facility] with workup for exertional SOB and palpitations incl CTA neg for PE, presents with exertional dyspnea, R sided chest pressure that radiates to R back and head and neck, and dizziness. Patient describes exacerbation of her symptoms with exertion, and describes symptoms as persistent but not worsened. Denies fever/chills, abd pain, N/V, dysuria.

## 2022-11-14 NOTE — ED PROVIDER NOTE - NSFOLLOWUPINSTRUCTIONS_ED_ALL_ED_FT
Please Follow up with your primary care physician. The results of your tests have been included.     Shortness of breath    Shortness of breath (dyspnea) means you have trouble breathing and could indicate a medical problem. Causes include lung disease, heart disease, low amount of red blood cells (anemia), poor physical fitness, being overweight, smoking, etc. Your health care provider today may not be able to find a cause for your shortness of breath after your exam. In this case, it is important to have a follow-up exam with your primary care physician as instructed. If medicines were prescribed, take them as directed for the full length of time directed. Refrain from tobacco products.    SEEK IMMEDIATE MEDICAL CARE IF YOU HAVE ANY OF THE FOLLOWING SYMPTOMS: worsening shortness of breath, chest pain, back pain, abdominal pain, fever, coughing up blood, lightheadedness/dizziness.    Dizziness  Dizziness can manifest as a feeling of unsteadiness or light-headedness. You may feel like you are about to faint. This condition can be caused by a number of things, including medicines, dehydration, or illness. Drink enough fluid to keep your urine clear or pale yellow. Do not drink alcohol and limit your caffeine intake. Avoid quick or sudden movements.  Rise slowly from chairs and steady yourself until you feel okay. In the morning, first sit up on the side of the bed.    SEEK IMMEDIATE MEDICAL CARE IF YOU HAVE ANY OF THE FOLLOWING SYMPTOMS: vomiting, changes in your vision or speech, weakness in your arms or legs, trouble speaking or swallowing, chest pain, abdominal pain, shortness of breath, sweating, bleeding, headache, neck pain, or fever.

## 2022-11-15 VITALS
HEART RATE: 99 BPM | OXYGEN SATURATION: 100 % | RESPIRATION RATE: 18 BRPM | DIASTOLIC BLOOD PRESSURE: 82 MMHG | SYSTOLIC BLOOD PRESSURE: 111 MMHG

## 2022-11-15 LAB
ALBUMIN SERPL ELPH-MCNC: 3.6 G/DL — SIGNIFICANT CHANGE UP (ref 3.3–5)
ALP SERPL-CCNC: 61 U/L — SIGNIFICANT CHANGE UP (ref 40–120)
ALT FLD-CCNC: 29 U/L — SIGNIFICANT CHANGE UP (ref 10–45)
ANION GAP SERPL CALC-SCNC: 11 MMOL/L — SIGNIFICANT CHANGE UP (ref 5–17)
AST SERPL-CCNC: 21 U/L — SIGNIFICANT CHANGE UP (ref 10–40)
BASOPHILS # BLD AUTO: 0.03 K/UL — SIGNIFICANT CHANGE UP (ref 0–0.2)
BASOPHILS NFR BLD AUTO: 0.7 % — SIGNIFICANT CHANGE UP (ref 0–2)
BILIRUB SERPL-MCNC: 0.2 MG/DL — SIGNIFICANT CHANGE UP (ref 0.2–1.2)
BUN SERPL-MCNC: 6 MG/DL — LOW (ref 7–23)
CALCIUM SERPL-MCNC: 9.1 MG/DL — SIGNIFICANT CHANGE UP (ref 8.4–10.5)
CHLORIDE SERPL-SCNC: 109 MMOL/L — HIGH (ref 96–108)
CO2 SERPL-SCNC: 23 MMOL/L — SIGNIFICANT CHANGE UP (ref 22–31)
CREAT SERPL-MCNC: 0.63 MG/DL — SIGNIFICANT CHANGE UP (ref 0.5–1.3)
CULTURE RESULTS: SIGNIFICANT CHANGE UP
EGFR: 109 ML/MIN/1.73M2 — SIGNIFICANT CHANGE UP
EOSINOPHIL # BLD AUTO: 0.14 K/UL — SIGNIFICANT CHANGE UP (ref 0–0.5)
EOSINOPHIL NFR BLD AUTO: 3 % — SIGNIFICANT CHANGE UP (ref 0–6)
GLUCOSE SERPL-MCNC: 97 MG/DL — SIGNIFICANT CHANGE UP (ref 70–99)
HCT VFR BLD CALC: 33.9 % — LOW (ref 34.5–45)
HGB BLD-MCNC: 11 G/DL — LOW (ref 11.5–15.5)
IMM GRANULOCYTES NFR BLD AUTO: 0.2 % — SIGNIFICANT CHANGE UP (ref 0–0.9)
LYMPHOCYTES # BLD AUTO: 0.63 K/UL — LOW (ref 1–3.3)
LYMPHOCYTES # BLD AUTO: 13.7 % — SIGNIFICANT CHANGE UP (ref 13–44)
MCHC RBC-ENTMCNC: 31.4 PG — SIGNIFICANT CHANGE UP (ref 27–34)
MCHC RBC-ENTMCNC: 32.4 GM/DL — SIGNIFICANT CHANGE UP (ref 32–36)
MCV RBC AUTO: 96.9 FL — SIGNIFICANT CHANGE UP (ref 80–100)
MONOCYTES # BLD AUTO: 0.6 K/UL — SIGNIFICANT CHANGE UP (ref 0–0.9)
MONOCYTES NFR BLD AUTO: 13 % — SIGNIFICANT CHANGE UP (ref 2–14)
NEUTROPHILS # BLD AUTO: 3.19 K/UL — SIGNIFICANT CHANGE UP (ref 1.8–7.4)
NEUTROPHILS NFR BLD AUTO: 69.4 % — SIGNIFICANT CHANGE UP (ref 43–77)
NRBC # BLD: 0 /100 WBCS — SIGNIFICANT CHANGE UP (ref 0–0)
PLATELET # BLD AUTO: 317 K/UL — SIGNIFICANT CHANGE UP (ref 150–400)
POTASSIUM SERPL-MCNC: 3.8 MMOL/L — SIGNIFICANT CHANGE UP (ref 3.5–5.3)
POTASSIUM SERPL-SCNC: 3.8 MMOL/L — SIGNIFICANT CHANGE UP (ref 3.5–5.3)
PROT SERPL-MCNC: 6 G/DL — SIGNIFICANT CHANGE UP (ref 6–8.3)
RBC # BLD: 3.5 M/UL — LOW (ref 3.8–5.2)
RBC # FLD: 13.1 % — SIGNIFICANT CHANGE UP (ref 10.3–14.5)
SODIUM SERPL-SCNC: 143 MMOL/L — SIGNIFICANT CHANGE UP (ref 135–145)
SPECIMEN SOURCE: SIGNIFICANT CHANGE UP
T3 SERPL-MCNC: 104 NG/DL — SIGNIFICANT CHANGE UP (ref 80–200)
T4 AB SER-ACNC: 7.7 UG/DL — SIGNIFICANT CHANGE UP (ref 4.6–12)
TROPONIN T, HIGH SENSITIVITY RESULT: 12 NG/L — SIGNIFICANT CHANGE UP (ref 0–51)
TSH SERPL-MCNC: 1.19 UIU/ML — SIGNIFICANT CHANGE UP (ref 0.27–4.2)
WBC # BLD: 4.6 K/UL — SIGNIFICANT CHANGE UP (ref 3.8–10.5)
WBC # FLD AUTO: 4.6 K/UL — SIGNIFICANT CHANGE UP (ref 3.8–10.5)

## 2022-11-15 PROCEDURE — 81001 URINALYSIS AUTO W/SCOPE: CPT

## 2022-11-15 PROCEDURE — G0378: CPT

## 2022-11-15 PROCEDURE — 84132 ASSAY OF SERUM POTASSIUM: CPT

## 2022-11-15 PROCEDURE — 82565 ASSAY OF CREATININE: CPT

## 2022-11-15 PROCEDURE — 36415 COLL VENOUS BLD VENIPUNCTURE: CPT

## 2022-11-15 PROCEDURE — 93306 TTE W/DOPPLER COMPLETE: CPT

## 2022-11-15 PROCEDURE — 82553 CREATINE MB FRACTION: CPT

## 2022-11-15 PROCEDURE — 82803 BLOOD GASES ANY COMBINATION: CPT

## 2022-11-15 PROCEDURE — 85018 HEMOGLOBIN: CPT

## 2022-11-15 PROCEDURE — 99217: CPT

## 2022-11-15 PROCEDURE — 85025 COMPLETE CBC W/AUTO DIFF WBC: CPT

## 2022-11-15 PROCEDURE — 36000 PLACE NEEDLE IN VEIN: CPT | Mod: XU

## 2022-11-15 PROCEDURE — 87086 URINE CULTURE/COLONY COUNT: CPT

## 2022-11-15 PROCEDURE — U0003: CPT

## 2022-11-15 PROCEDURE — 99285 EMERGENCY DEPT VISIT HI MDM: CPT | Mod: 25

## 2022-11-15 PROCEDURE — U0005: CPT

## 2022-11-15 PROCEDURE — 82330 ASSAY OF CALCIUM: CPT

## 2022-11-15 PROCEDURE — 70496 CT ANGIOGRAPHY HEAD: CPT | Mod: MA

## 2022-11-15 PROCEDURE — 84443 ASSAY THYROID STIM HORMONE: CPT

## 2022-11-15 PROCEDURE — 83605 ASSAY OF LACTIC ACID: CPT

## 2022-11-15 PROCEDURE — 93306 TTE W/DOPPLER COMPLETE: CPT | Mod: 26

## 2022-11-15 PROCEDURE — 82550 ASSAY OF CK (CPK): CPT

## 2022-11-15 PROCEDURE — 85014 HEMATOCRIT: CPT

## 2022-11-15 PROCEDURE — 80053 COMPREHEN METABOLIC PANEL: CPT

## 2022-11-15 PROCEDURE — 71046 X-RAY EXAM CHEST 2 VIEWS: CPT

## 2022-11-15 PROCEDURE — 71275 CT ANGIOGRAPHY CHEST: CPT | Mod: MD

## 2022-11-15 PROCEDURE — 84480 ASSAY TRIIODOTHYRONINE (T3): CPT

## 2022-11-15 PROCEDURE — 84484 ASSAY OF TROPONIN QUANT: CPT

## 2022-11-15 PROCEDURE — 70498 CT ANGIOGRAPHY NECK: CPT | Mod: MA

## 2022-11-15 PROCEDURE — 84436 ASSAY OF TOTAL THYROXINE: CPT

## 2022-11-15 PROCEDURE — 84295 ASSAY OF SERUM SODIUM: CPT

## 2022-11-15 PROCEDURE — 82435 ASSAY OF BLOOD CHLORIDE: CPT

## 2022-11-15 PROCEDURE — 82947 ASSAY GLUCOSE BLOOD QUANT: CPT

## 2022-11-15 RX ADMIN — SODIUM CHLORIDE 125 MILLILITER(S): 9 INJECTION INTRAMUSCULAR; INTRAVENOUS; SUBCUTANEOUS at 08:21

## 2022-11-15 RX ADMIN — SODIUM CHLORIDE 125 MILLILITER(S): 9 INJECTION INTRAMUSCULAR; INTRAVENOUS; SUBCUTANEOUS at 00:16

## 2022-11-15 NOTE — CONSULT NOTE ADULT - ASSESSMENT
Patient seen and examined, agree with above assessment and plan as transcribed above.    - Nonanginal symptoms r/o for MI  - No interval changes in echo  - recent stress echo in office was unremarkable  - No need for further inpatient cardiac work up.  - No Longer light headed after IVF  - No LOC      Max Riley MD, Located within [facility]  BEEPER [phone]

## 2022-11-15 NOTE — ED CDU PROVIDER SUBSEQUENT DAY NOTE - ATTENDING APP SHARED VISIT CONTRIBUTION OF CARE
Patient is a 47F w/ h/o breast cancer on chemo, HTN, HLD, Hypothyroid, who presented with shortness of breath, palpitations, dizziness with change in position, exertional dyspnea. Patient had a CTA of head, neck and chest without acute pathology. No PE on work up. Patient in CDU for echo and cardiology recs and symptomatic treatment. Patient reports she went to her cardiologist last week, had an echo that was normal at that time. No fevers.     VS noted  Gen. no acute distress, chronically ill  HEENT: EOMI, mmm  Lungs: CTAB/L no C/ W /R   CVS: tachycardic  Abd; Soft non tender, non distended   Ext: no edema, no calf tenderness  Skin: no rash  Neuro AAOx3 non focal clear speech  a/p: tachycardia, palpitations, dizziness, FARRAR - neg work up thus far. Patient in CDU for tele monitoring, echo, supportive care and cardiology recommendations. Differential includes chemo related cardiomyopathy, dehydration, orthostatic hypotension.   - Chandra CONLEY

## 2022-11-15 NOTE — ED CDU PROVIDER SUBSEQUENT DAY NOTE - HISTORY
Patient seen at bedside in NAD.  No events on tele. HR 98 on monitor.  Patient resting comfortably without complaints. Will continue to tele monitor. Plan for cards consult and ECHO in AM. Radha Malloy PA-C

## 2022-11-15 NOTE — ED ADULT NURSE REASSESSMENT NOTE - NSIMPLEMENTINTERV_GEN_ALL_ED
Implemented All Fall with Harm Risk Interventions:  [place]on to call system. Call bell, personal items and telephone within reach. Instruct patient to call for assistance. Room bathroom lighting operational. Non-slip footwear when patient is off stretcher. Physically safe environment: no spills, clutter or unnecessary equipment. Stretcher in lowest position, wheels locked, appropriate side rails in place. Provide visual cue, wrist band, yellow gown, etc. Monitor gait and stability. Monitor for mental status changes and reorient to person, place, and time. Review medications for side effects contributing to fall risk. Reinforce activity limits and safety measures with patient and family. Provide visual clues: red socks.

## 2022-11-15 NOTE — ED CDU PROVIDER SUBSEQUENT DAY NOTE - PROGRESS NOTE DETAILS
Pt resting comfortably. NAD. No complaints. VSS. Denies chest pain, or sob, endorses lightheadedness with ambulation, pt tachycardic to 110s-120s with little movement, awaiting echo and cards eval. Pt with improvement in symptoms, denies sob, dizziness, lightheadedness or discomfort at rest or with exertion. no events on tele, HR in high 90s, echo WNL, and seen by Dr. Riley who believes symptoms are not cardiac. Pt agreeable for d/c and close follow up with her oncologist and her cardiologist, all questions answered, d/w Dr. Artis.

## 2022-11-15 NOTE — CONSULT NOTE ADULT - SUBJECTIVE AND OBJECTIVE BOX
C A R D I O L O G Y  *********************    DATE OF SERVICE: 11-15-22    HISTORY OF PRESENT ILLNESS: HPI:  Patient is a 46 y/o Female well known to our office with PMH of breast cancer on chemo, HTN, HLD, and Hypothyroidism who presented with FARRAR, chest pressure, and dizziness. Cardiology consulted for further evaluation. Patient reports feeling better today s/p IVF. Reported being hypertensive when in pain yesterday but otherwise BP has been on lower end and has not been taking her Amlodipine. Her PCP recently switched her to metoprolol but she did not take it yet. Denies further chest pain or SOB. Denies palpitations or syncope.    PAST MEDICAL & SURGICAL HISTORY:  HTN (hypertension)      HLD (hyperlipidemia)      Hypothyroid      Breast cancer        MEDICATIONS:  MEDICATIONS  (STANDING):      Allergies    No Known Allergies    Intolerances        FAMILY HISTORY:  No pertinent family history in first degree relatives      Non-contributary for premature coronary disease or sudden cardiac death    SOCIAL HISTORY:    [x ] Non-smoker  [ ] Smoker  [ ] Alcohol    FLU VACCINE THIS YEAR STARTS IN AUGUST:  [ ] Yes    [ ] No    IF OVER 65 HAVE YOU EVER HAD A PNA VACCINE:  [ ] Yes    [ ] No       [ ] N/A      REVIEW OF SYSTEMS:  [x ]chest pain  [ x ]shortness of breath  [  ]palpitations  [  ]syncope  [x ]near syncope [ ]upper extremity weakness   [ ] lower extremity weakness  [  ]diplopia  [  ]altered mental status   [  ]fevers  [ ]chills [ ]nausea  [ ]vomiting  [  ]dysphagia    [ ]abdominal pain  [ ]melena  [ ]BRBPR    [  ]epistaxis  [  ]rash    [ ]lower extremity edema        [X] All others negative	  [ ] Unable to obtain      LABS:	 	    CARDIAC MARKERS:  CARDIAC MARKERS ( 15 Nov 2022 07:07 )  x     / x     / 47 U/L / x     / 1.9 ng/mL                              11.0   4.60  )-----------( 317      ( 15 Nov 2022 07:07 )             33.9     Hb Trend: 11.0<--    11-15    143  |  109<H>  |  6<L>  ----------------------------<  97  3.8   |  23  |  0.63    Ca    9.1      15 Nov 2022 07:07    TPro  6.0  /  Alb  3.6  /  TBili  0.2  /  DBili  x   /  AST  21  /  ALT  29  /  AlkPhos  61  11-15    Creatinine Trend: 0.63<--, 0.59<--, 0.56<--    Coags:      proBNP:   Lipid Profile:   HgA1c:   TSH: Thyroid Stimulating Hormone, Serum: 1.19 uIU/mL (11-15 @ 00:32)          PHYSICAL EXAM:  T(C): 36.4 (11-15-22 @ 15:56), Max: 36.7 (11-15-22 @ 03:35)  HR: 99 (11-15-22 @ 16:55) (96 - 116)  BP: 111/82 (11-15-22 @ 16:55) (104/90 - 139/97)  RR: 18 (11-15-22 @ 16:55) (18 - 20)  SpO2: 100% (11-15-22 @ 16:55) (97% - 100%)  Wt(kg): --   BMI (kg/m2): 20.6 (11-14-22 @ 12:57)  I&O's Summary      Gen: Appears well in NAD  HEENT:  (-)icterus (-)pallor  CV: N S1 S2 1/6 CHENCHO (+)2 Pulses B/l  Resp:  Clear to auscultation B/L, normal effort  GI: (+) BS Soft, NT, ND  Lymph:  (-)Edema, (-)obvious lymphadenopathy  Skin: Warm to touch, Normal turgor  Psych: Appropriate mood and affect      TELEMETRY: SR/ST 	      ECG: Sinus Tachycardia 	    < from: Transthoracic Echocardiogram (11.15.22 @ 13:06) >  Conclusions:  1. Normal left ventricular internal dimensions and wall  thicknesses.  2. Normal left ventricular systolic function. No segmental  wall motion abnormalities.  3. Normal right ventricular size and function.  *** No previous Echo exam.    < end of copied text >      RADIOLOGY:  < from: CT Angio Chest PE Protocol w/ IV Cont (11.14.22 @ 21:51) >  IMPRESSION:  No evidence of pulmonary embolism.  --- End of Report ---    < end of copied text >      ASSESSMENT/PLAN: Patient is a 46 y/o Female well known to our office with PMH of breast cancer on chemo, HTN, HLD, and Hypothyroidism who presented with FARRAR, chest pressure, and dizziness. Cardiology consulted for further evaluation.    - Not in clinical HF and MI ruled out  - No acute ischemic EKG changes  - CTA neg for PE  - Telemetry/EKG only with sinus tachycardia  - TSH WNL  - Patient with normal stress echo in our office 8/2022  - Would hold off on metoprolol - prefer not to treat only sinus tachycardia likely driven by underlying medical conditions such as dehydration/malignancy on chemo  - Hold Amlodipine given recent episodes of low BP - will reassess as outpatient  - Repeat TTE with normal LV function, no wall motion abnormalities  - No further inpatient cardiac w/u planned - stable for discharge from CV perspective  - Patient has f/u with Dr. Gonzalez on 11/22 at 1:30 PM    Ralf Smith PA-C  Pager: [phone]     C A R D I O L O G Y  *********************    DATE OF SERVICE: 11-15-22    HISTORY OF PRESENT ILLNESS: HPI:  Patient is a 48 y/o Female well known to our office with PMH of breast cancer on chemo, HTN, HLD, and Hypothyroidism who presented with FARRAR, chest pressure, and dizziness. Cardiology consulted for further evaluation. Patient reports feeling better today s/p IVF. Reported being hypertensive when in pain yesterday but otherwise BP has been on lower end and has not been taking her Amlodipine. Her PCP recently switched her to metoprolol but she did not take it yet. Denies further chest pain or SOB. Denies palpitations or syncope.    PAST MEDICAL & SURGICAL HISTORY:  HTN (hypertension)      HLD (hyperlipidemia)      Hypothyroid      Breast cancer        MEDICATIONS:  MEDICATIONS  (STANDING):      Allergies    No Known Allergies    Intolerances        FAMILY HISTORY:  No pertinent family history in first degree relatives      Non-contributary for premature coronary disease or sudden cardiac death    SOCIAL HISTORY:    [x ] Non-smoker  [ ] Smoker  [ ] Alcohol    FLU VACCINE THIS YEAR STARTS IN AUGUST:  [ ] Yes    [ ] No    IF OVER 65 HAVE YOU EVER HAD A PNA VACCINE:  [ ] Yes    [ ] No       [ ] N/A      REVIEW OF SYSTEMS:  [x ]chest pain  [ x ]shortness of breath  [  ]palpitations  [  ]syncope  [x ]near syncope [ ]upper extremity weakness   [ ] lower extremity weakness  [  ]diplopia  [  ]altered mental status   [  ]fevers  [ ]chills [ ]nausea  [ ]vomiting  [  ]dysphagia    [ ]abdominal pain  [ ]melena  [ ]BRBPR    [  ]epistaxis  [  ]rash    [ ]lower extremity edema        [X] All others negative	  [ ] Unable to obtain      LABS:	 	    CARDIAC MARKERS:  CARDIAC MARKERS ( 15 Nov 2022 07:07 )  x     / x     / 47 U/L / x     / 1.9 ng/mL                              11.0   4.60  )-----------( 317      ( 15 Nov 2022 07:07 )             33.9     Hb Trend: 11.0<--    11-15    143  |  109<H>  |  6<L>  ----------------------------<  97  3.8   |  23  |  0.63    Ca    9.1      15 Nov 2022 07:07    TPro  6.0  /  Alb  3.6  /  TBili  0.2  /  DBili  x   /  AST  21  /  ALT  29  /  AlkPhos  61  11-15    Creatinine Trend: 0.63<--, 0.59<--, 0.56<--    Coags:      proBNP:   Lipid Profile:   HgA1c:   TSH: Thyroid Stimulating Hormone, Serum: 1.19 uIU/mL (11-15 @ 00:32)          PHYSICAL EXAM:  T(C): 36.4 (11-15-22 @ 15:56), Max: 36.7 (11-15-22 @ 03:35)  HR: 99 (11-15-22 @ 16:55) (96 - 116)  BP: 111/82 (11-15-22 @ 16:55) (104/90 - 139/97)  RR: 18 (11-15-22 @ 16:55) (18 - 20)  SpO2: 100% (11-15-22 @ 16:55) (97% - 100%)  Wt(kg): --   BMI (kg/m2): 20.6 (11-14-22 @ 12:57)  I&O's Summary      Gen: Appears well in NAD  HEENT:  (-)icterus (-)pallor  CV: N S1 S2 1/6 CHENCHO (+)2 Pulses B/l  Resp:  Clear to auscultation B/L, normal effort  GI: (+) BS Soft, NT, ND  Lymph:  (-)Edema, (-)obvious lymphadenopathy  Skin: Warm to touch, Normal turgor  Psych: Appropriate mood and affect      TELEMETRY: SR/ST 	      ECG: Sinus Tachycardia 	    < from: Transthoracic Echocardiogram (11.15.22 @ 13:06) >  Conclusions:  1. Normal left ventricular internal dimensions and wall  thicknesses.  2. Normal left ventricular systolic function. No segmental  wall motion abnormalities.  3. Normal right ventricular size and function.  *** No previous Echo exam.    < end of copied text >      RADIOLOGY:  < from: CT Angio Chest PE Protocol w/ IV Cont (11.14.22 @ 21:51) >  IMPRESSION:  No evidence of pulmonary embolism.  --- End of Report ---    < end of copied text >      ASSESSMENT/PLAN: Patient is a 48 y/o Female well known to our office with PMH of breast cancer on chemo, HTN, HLD, and Hypothyroidism who presented with FARRAR, chest pressure, and dizziness. Cardiology consulted for further evaluation.    - Not in clinical HF and MI ruled out  - No acute ischemic EKG changes  - CTA neg for PE  - Telemetry/EKG only with sinus tachycardia  - TSH WNL  - Patient with normal stress echo in our office 8/2022  - Would hold off on metoprolol - prefer not to treat only sinus tachycardia likely driven by underlying medical conditions such as dehydration/malignancy on chemo  - Hold Amlodipine given recent episodes of low BP - will reassess as outpatient  - Orthostatics negative today (however now s/p IVF)  - Repeat TTE with normal LV function, no wall motion abnormalities  - No further inpatient cardiac w/u planned - stable for discharge from CV perspective  - Patient has f/u with Dr. Gonzalez on 11/22 at 1:30 PM    Ralf Smith PA-C  Pager: [phone]

## 2022-11-15 NOTE — ED ADULT NURSE REASSESSMENT NOTE - NS ED NURSE REASSESS COMMENT FT1
07.00 Am Received the Pt from  KEVIN Cartagena  . Pt is Observed for CP  For ECHO. Received the Pt A&OX 4 obeys commands Edyta N/V/D fever chills cp SOB   Comfort care & safety measures continued  IV site looks clean & dry no signs of infiltration noted pt denies  pain IV site .  Pt is advised to call for help  call bell with in the reach pt verbalized the understanding .  pending CDU  MD brandon . GCS 15/15 A&OX 4 PERRLA  size 3 Strong upper & lower extremities steady gait   No facial droop  No Hand Leg drop denies numbness tingling Continue to monitor

## 2022-11-15 NOTE — ED CDU PROVIDER SUBSEQUENT DAY NOTE - MEDICAL DECISION MAKING DETAILS
Chandra CONLEY: tachycardia, palpitations, dizziness, FARRAR - neg work up thus far. Patient in CDU for tele monitoring, echo, supportive care and cardiology recommendations. Differential includes chemo related cardiomyopathy, dehydration, orthostatic hypotension.

## 2022-11-15 NOTE — ED ADULT NURSE REASSESSMENT NOTE - NS ED NURSE REASSESS COMMENT FT1
13.30 Pt is evaluated by CDU MD Monalisa Gallegos  . pt is feeling better.  Pt is discharged . Ml out   KEERTHI Sam   explained the follow up care & gave the discharge summary  . Pt has stable vitals steady gait A&OX 4 at the time of Discharge

## 2022-11-15 NOTE — ED PROCEDURE NOTE - ATTENDING CONTRIBUTION TO CARE
I, Cheng Leigh, performed a history and physical exam of the patient and discussed their management with the resident and /or advanced care provider. I reviewed the resident and /or ACP's note and agree with the documented findings and plan of care. I was present and available for all procedures.

## 2022-11-21 ENCOUNTER — OUTPATIENT (OUTPATIENT)
Dept: OUTPATIENT SERVICES | Facility: HOSPITAL | Age: 47
LOS: 1 days | End: 2022-11-21

## 2022-11-21 VITALS
OXYGEN SATURATION: 99 % | RESPIRATION RATE: 15 BRPM | DIASTOLIC BLOOD PRESSURE: 70 MMHG | WEIGHT: 126.1 LBS | TEMPERATURE: 98 F | HEART RATE: 100 BPM | HEIGHT: 57 IN | SYSTOLIC BLOOD PRESSURE: 110 MMHG

## 2022-11-21 DIAGNOSIS — R42 DIZZINESS AND GIDDINESS: ICD-10-CM

## 2022-11-21 DIAGNOSIS — Z98.890 OTHER SPECIFIED POSTPROCEDURAL STATES: Chronic | ICD-10-CM

## 2022-11-21 DIAGNOSIS — E03.9 HYPOTHYROIDISM, UNSPECIFIED: ICD-10-CM

## 2022-11-21 DIAGNOSIS — C50.912 MALIGNANT NEOPLASM OF UNSPECIFIED SITE OF LEFT FEMALE BREAST: ICD-10-CM

## 2022-11-21 DIAGNOSIS — Z98.891 HISTORY OF UTERINE SCAR FROM PREVIOUS SURGERY: Chronic | ICD-10-CM

## 2022-11-21 LAB
ALBUMIN SERPL ELPH-MCNC: 3.9 G/DL — SIGNIFICANT CHANGE UP (ref 3.3–5)
ALP SERPL-CCNC: 70 U/L — SIGNIFICANT CHANGE UP (ref 40–120)
ALT FLD-CCNC: 45 U/L — HIGH (ref 4–33)
ANION GAP SERPL CALC-SCNC: 10 MMOL/L — SIGNIFICANT CHANGE UP (ref 7–14)
AST SERPL-CCNC: 35 U/L — HIGH (ref 4–32)
BILIRUB SERPL-MCNC: <0.2 MG/DL — SIGNIFICANT CHANGE UP (ref 0.2–1.2)
BUN SERPL-MCNC: 8 MG/DL — SIGNIFICANT CHANGE UP (ref 7–23)
CALCIUM SERPL-MCNC: 9.3 MG/DL — SIGNIFICANT CHANGE UP (ref 8.4–10.5)
CHLORIDE SERPL-SCNC: 102 MMOL/L — SIGNIFICANT CHANGE UP (ref 98–107)
CO2 SERPL-SCNC: 25 MMOL/L — SIGNIFICANT CHANGE UP (ref 22–31)
CREAT SERPL-MCNC: 0.64 MG/DL — SIGNIFICANT CHANGE UP (ref 0.5–1.3)
EGFR: 108 ML/MIN/1.73M2 — SIGNIFICANT CHANGE UP
GLUCOSE SERPL-MCNC: 102 MG/DL — HIGH (ref 70–99)
HCG UR QL: NEGATIVE — SIGNIFICANT CHANGE UP
POTASSIUM SERPL-MCNC: 3.9 MMOL/L — SIGNIFICANT CHANGE UP (ref 3.5–5.3)
POTASSIUM SERPL-SCNC: 3.9 MMOL/L — SIGNIFICANT CHANGE UP (ref 3.5–5.3)
PROT SERPL-MCNC: 6.7 G/DL — SIGNIFICANT CHANGE UP (ref 6–8.3)
SODIUM SERPL-SCNC: 137 MMOL/L — SIGNIFICANT CHANGE UP (ref 135–145)

## 2022-11-21 PROCEDURE — 93010 ELECTROCARDIOGRAM REPORT: CPT

## 2022-11-21 NOTE — H&P PST ADULT - ASSESSMENT
47 year old female with PMH of HTN, Hypothyroidism, with pre op diagnosis of malignant neoplasm of left female, for pre op evaluation prior to scheduled surgery- Left total mastectomy with sentinel node biopsy left breast with Dr Dixon

## 2022-11-21 NOTE — H&P PST ADULT - NEUROLOGICAL COMMENTS
patient reports since chemotherapy she feels palpitations and dizziness patient reports since 11 th chemotherapy cycle she has occasional palpitations and dizziness

## 2022-11-21 NOTE — H&P PST ADULT - PROBLEM SELECTOR PLAN 2
Patient complaints of dizziness and palpitations- she was admitted [facility] ER - 11/05/2022 for the same symptoms- EKG at ER- Sinus tachycardia-116.  Patient was followed up with cardiologist Dr Gonzalez last week- for palpitations and dizziness- ECHO was done- as per pt, cardiologist  instructed her to drink more fluids.    Patient still complaints of dizziness and palpitations- EKG done at [facility]- surgeon requesting medical evaluation [rior to surgery- will request copy of medical evaluation.  ECHO results in chart. Patient complaints of dizziness and palpitations- she was admitted [facility] ER - 11/05/2022 for the same symptoms- EKG at ER- Sinus tachycardia-116.  Patient was followed up with cardiologist Dr Gonzalez last week- for palpitations and dizziness- ECHO was done- as per pt, cardiologist  instructed her to drink more fluids.    Patient still complaints of dizziness and palpitations( HR -100-116)- EKG done at [facility]- surgeon requesting medical evaluation prior to surgery- will request copy of medical evaluation.  ECHO results in chart.

## 2022-11-21 NOTE — H&P PST ADULT - INTERPRETATION SERVICES DECLINED
patient speaks english, primary language is [facility] patient speaks english, primary language is [facility]

## 2022-11-21 NOTE — H&P PST ADULT - PROBLEM SELECTOR PLAN 1
Patient is tentatively scheduled Left total mastectomy with sentinel node biopsy left breast with Dr Dixon on 12/01/2022.  Pre-op instructions provided. Pt given verbal and written instructions with teach back on chlorhexidine wash and pepcid. Pt verbalized understanding with return demonstration.    Routine Covid PCR test ordered .Instructions regarding covid PCR test and locations for covid testing site provided. Pt verbalized understanding  Urine cup provided for day of procedure pregnancy test.

## 2022-11-21 NOTE — H&P PST ADULT - NEGATIVE OPHTHALMOLOGIC SYMPTOMS
wears glasses/no diplopia/no photophobia/no lacrimation L/no lacrimation R/no blurred vision L/no blurred vision R

## 2022-11-21 NOTE — H&P PST ADULT - NEGATIVE SKIN SYMPTOMS
Pharmacy sent a fax about magic mouth wash stating   \"need to have recipe that you want to use for this please. Or I can order compound kit for $115 cost to pt. Thanks! \" no rash/no itching/no dryness

## 2022-11-21 NOTE — H&P PST ADULT - CARDIOVASCULAR COMMENTS
pt reports of dizziness and occasional palpitations- was admitted in ER for the same, iv fluids given

## 2022-11-30 ENCOUNTER — TRANSCRIPTION ENCOUNTER (OUTPATIENT)
Age: 47
End: 2022-11-30

## 2022-11-30 NOTE — ASU PATIENT PROFILE, ADULT - PATIENT’S MOTHER’S MAIDEN FIRST NAME (INFO USED BY THE IMMUNIZATION REGISTRY):
-- DO NOT REPLY / DO NOT REPLY ALL --  -- Message is from the [facility]--    COVID-19 Universal Screening: N/A - Not about scheduling    General Patient Message      Reason for Call: patient is returning a missed call to make an appointment    Caller Information       Type Contact Phone    11/27/2020 10:12 AM CST Phone (Incoming) Denver Beavers (Self) [phone] (M)          Alternative phone number: none    Turnaround time given to caller:   \"This message will be sent to [state Provider's name]. The clinical team will fulfill your request as soon as they review your message.\"    
Spoke w/ pt and scheduled for 6/30/2021.  
Keith

## 2022-11-30 NOTE — ASU PATIENT PROFILE, ADULT - FALL HARM RISK - UNIVERSAL INTERVENTIONS
Bed in lowest position, wheels locked, appropriate side rails in place/Call bell, personal items and telephone in reach/Instruct patient to call for assistance before getting out of bed or chair/Non-slip footwear when patient is out of bed/[place] to call system/Physically safe environment - no spills, clutter or unnecessary equipment/Purposeful Proactive Rounding/Room/bathroom lighting operational, light cord in reach

## 2022-12-01 ENCOUNTER — TRANSCRIPTION ENCOUNTER (OUTPATIENT)
Age: 47
End: 2022-12-01

## 2022-12-01 ENCOUNTER — RESULT REVIEW (OUTPATIENT)
Age: 47
End: 2022-12-01

## 2022-12-01 ENCOUNTER — APPOINTMENT (OUTPATIENT)
Dept: NUCLEAR MEDICINE | Facility: HOSPITAL | Age: 47
End: 2022-12-01

## 2022-12-01 ENCOUNTER — INPATIENT (INPATIENT)
Facility: HOSPITAL | Age: 47
LOS: 0 days | Discharge: HOME | End: 2022-12-02
Attending: SPECIALIST | Admitting: SPECIALIST

## 2022-12-01 VITALS
TEMPERATURE: 98 F | DIASTOLIC BLOOD PRESSURE: 82 MMHG | HEART RATE: 88 BPM | WEIGHT: 126.1 LBS | RESPIRATION RATE: 16 BRPM | HEIGHT: 57 IN | OXYGEN SATURATION: 100 % | SYSTOLIC BLOOD PRESSURE: 115 MMHG

## 2022-12-01 DIAGNOSIS — C50.912 MALIGNANT NEOPLASM OF UNSPECIFIED SITE OF LEFT FEMALE BREAST: ICD-10-CM

## 2022-12-01 DIAGNOSIS — Z98.890 OTHER SPECIFIED POSTPROCEDURAL STATES: Chronic | ICD-10-CM

## 2022-12-01 DIAGNOSIS — Z98.891 HISTORY OF UTERINE SCAR FROM PREVIOUS SURGERY: Chronic | ICD-10-CM

## 2022-12-01 LAB — HCG UR QL: NEGATIVE — SIGNIFICANT CHANGE UP

## 2022-12-01 PROCEDURE — 88331 PATH CONSLTJ SURG 1 BLK 1SPC: CPT | Mod: 26

## 2022-12-01 PROCEDURE — 88360 TUMOR IMMUNOHISTOCHEM/MANUAL: CPT | Mod: 26

## 2022-12-01 PROCEDURE — 88307 TISSUE EXAM BY PATHOLOGIST: CPT | Mod: 26

## 2022-12-01 PROCEDURE — 88305 TISSUE EXAM BY PATHOLOGIST: CPT | Mod: 26

## 2022-12-01 DEVICE — LIGATING CLIPS WECK HORIZON LARGE (ORANGE) 24: Type: IMPLANTABLE DEVICE | Status: FUNCTIONAL

## 2022-12-01 DEVICE — LIGATING CLIPS WECK HORIZON SMALL-WIDE (RED) 24: Type: IMPLANTABLE DEVICE | Status: FUNCTIONAL

## 2022-12-01 DEVICE — LIGATING CLIPS WECK HORIZON MEDIUM (BLUE) 24: Type: IMPLANTABLE DEVICE | Status: FUNCTIONAL

## 2022-12-01 RX ORDER — SODIUM CHLORIDE 9 MG/ML
1000 INJECTION, SOLUTION INTRAVENOUS
Refills: 0 | Status: DISCONTINUED | OUTPATIENT
Start: 2022-12-01 | End: 2022-12-01

## 2022-12-01 RX ORDER — ACETAMINOPHEN 500 MG
1000 TABLET ORAL EVERY 6 HOURS
Refills: 0 | Status: COMPLETED | OUTPATIENT
Start: 2022-12-01 | End: 2022-12-02

## 2022-12-01 RX ORDER — ONDANSETRON 8 MG/1
4 TABLET, FILM COATED ORAL ONCE
Refills: 0 | Status: DISCONTINUED | OUTPATIENT
Start: 2022-12-01 | End: 2022-12-01

## 2022-12-01 RX ORDER — SODIUM CHLORIDE 9 MG/ML
500 INJECTION, SOLUTION INTRAVENOUS ONCE
Refills: 0 | Status: COMPLETED | OUTPATIENT
Start: 2022-12-01 | End: 2022-12-01

## 2022-12-01 RX ORDER — HYDROMORPHONE HYDROCHLORIDE 2 MG/ML
0.5 INJECTION INTRAMUSCULAR; INTRAVENOUS; SUBCUTANEOUS
Refills: 0 | Status: DISCONTINUED | OUTPATIENT
Start: 2022-12-01 | End: 2022-12-01

## 2022-12-01 RX ORDER — ACETAMINOPHEN 500 MG
975 TABLET ORAL EVERY 6 HOURS
Refills: 0 | Status: DISCONTINUED | OUTPATIENT
Start: 2022-12-01 | End: 2022-12-01

## 2022-12-01 RX ORDER — OXYCODONE HYDROCHLORIDE 5 MG/1
5 TABLET ORAL EVERY 6 HOURS
Refills: 0 | Status: DISCONTINUED | OUTPATIENT
Start: 2022-12-01 | End: 2022-12-02

## 2022-12-01 RX ORDER — HYDROMORPHONE HYDROCHLORIDE 2 MG/ML
1 INJECTION INTRAMUSCULAR; INTRAVENOUS; SUBCUTANEOUS
Refills: 0 | Status: DISCONTINUED | OUTPATIENT
Start: 2022-12-01 | End: 2022-12-01

## 2022-12-01 RX ORDER — ALBUMIN HUMAN 25 %
250 VIAL (ML) INTRAVENOUS ONCE
Refills: 0 | Status: COMPLETED | OUTPATIENT
Start: 2022-12-01 | End: 2022-12-01

## 2022-12-01 RX ORDER — LEVOTHYROXINE SODIUM 125 MCG
1 TABLET ORAL
Qty: 0 | Refills: 0 | DISCHARGE

## 2022-12-01 RX ORDER — OXYCODONE HYDROCHLORIDE 5 MG/1
10 TABLET ORAL EVERY 6 HOURS
Refills: 0 | Status: DISCONTINUED | OUTPATIENT
Start: 2022-12-01 | End: 2022-12-02

## 2022-12-01 RX ORDER — METOPROLOL TARTRATE 50 MG
1 TABLET ORAL
Qty: 0 | Refills: 0 | DISCHARGE

## 2022-12-01 RX ORDER — ATORVASTATIN CALCIUM 80 MG/1
1 TABLET, FILM COATED ORAL
Qty: 0 | Refills: 0 | DISCHARGE

## 2022-12-01 RX ADMIN — Medication 400 MILLIGRAM(S): at 17:33

## 2022-12-01 RX ADMIN — HYDROMORPHONE HYDROCHLORIDE 0.5 MILLIGRAM(S): 2 INJECTION INTRAMUSCULAR; INTRAVENOUS; SUBCUTANEOUS at 14:10

## 2022-12-01 RX ADMIN — SODIUM CHLORIDE 1000 MILLILITER(S): 9 INJECTION, SOLUTION INTRAVENOUS at 14:35

## 2022-12-01 RX ADMIN — SODIUM CHLORIDE 75 MILLILITER(S): 9 INJECTION, SOLUTION INTRAVENOUS at 14:08

## 2022-12-01 RX ADMIN — HYDROMORPHONE HYDROCHLORIDE 0.5 MILLIGRAM(S): 2 INJECTION INTRAMUSCULAR; INTRAVENOUS; SUBCUTANEOUS at 14:25

## 2022-12-01 RX ADMIN — Medication 125 MILLILITER(S): at 16:54

## 2022-12-01 NOTE — BRIEF OPERATIVE NOTE - OPERATION/FINDINGS
- Bilateral mastectomy with left SLNB, R axillary dissection.   - SLNB negative per pathology  - R axillary dissection, long thoracic nerve and thoracodorsal nerve   - 4x SHOAIB drains  - - Bilateral mastectomy with left SLNB, R axillary dissection.   - SLNB negative per pathology  - R axillary dissection, long thoracic nerve and thoracodorsal nerve identified and spared   - 4x SHOAIB drains  -

## 2022-12-01 NOTE — PATIENT PROFILE ADULT - FALL HARM RISK - HARM RISK INTERVENTIONS

## 2022-12-01 NOTE — ASU PREOP CHECKLIST - VERIFY SURGICAL SITE/SIDE WITH PATIENT
OTC ibuprofen as needed; medicine referral; any new or worsening symptoms, pt will return to ER. bilateral breasts/done

## 2022-12-01 NOTE — CHART NOTE - NSCHARTNOTEFT_GEN_A_CORE
Surgery Post op Note    Procedure: bilateral total mastectomy    SUBJECTIVE: Patient seen and evaluated at the bedside. Patient reported feeling slightly dizzy and had appropriate, mild left axillary pain.     SOB:  [ ] YES [X ] NO  Chest Discomfort: [ ] YES [X ] NO    Nausea: [ ] YES [X ] NO           Vomiting: [ ] YES [ X] NO  Flatus: [ ] YES [X ] NO             Bowel Movement: [ ] YES [X] NO  Void: [X ]YES [ ]No         Pain Control Adequate: [X ] YES [ ] NO    Objective:   Vital Signs Last 24 Hrs  T(C): 36.8 (01 Dec 2022 18:55), Max: 37 (01 Dec 2022 16:00)  T(F): 98.2 (01 Dec 2022 18:55), Max: 98.6 (01 Dec 2022 16:00)  HR: 102 (01 Dec 2022 18:55) (88 - 110)  BP: 106/78 (01 Dec 2022 18:55) (90/65 - 115/82)  BP(mean): 71 (01 Dec 2022 18:00) (66 - 84)  RR: 16 (01 Dec 2022 18:55) (11 - 23)  SpO2: 98% (01 Dec 2022 18:55) (93% - 100%)    Parameters below as of 01 Dec 2022 18:55  Patient On (Oxygen Delivery Method): room air      I&O's Summary    01 Dec 2022 07:01  -  01 Dec 2022 19:01  --------------------------------------------------------  IN: 875 mL / OUT: 1530 mL / NET: -655 mL      I&O's Detail    01 Dec 2022 07:01  -  01 Dec 2022 19:01  --------------------------------------------------------  IN:    IV PiggyBack: 350 mL    Lactated Ringers: 375 mL    Oral Fluid: 150 mL  Total IN: 875 mL    OUT:    Bulb (mL): 28 mL    Bulb (mL): 5 mL    Bulb (mL): 34 mL    Bulb (mL): 13 mL    Voided (mL): 1450 mL  Total OUT: 1530 mL    Total NET: -655 mL            LABS:                MEDICATIONS  (STANDING):  acetaminophen   IVPB .. 1000 milliGRAM(s) IV Intermittent every 6 hours    MEDICATIONS  (PRN):  oxyCODONE    IR 5 milliGRAM(s) Oral every 6 hours PRN Moderate Pain (4 - 6)  oxyCODONE    IR 10 milliGRAM(s) Oral every 6 hours PRN Severe Pain (7 - 10)      Physical exam  General- no acute distress, resting  Abdomen- soft, non-tender, non-distended  Chest- surgical bra in place. Dressing without strikethrough or collections. SHOAIB x2 in place on left with ss output, SHOAIB x2 in place on right with ss output.  Respiratory- unlabored respirations  Extremities- moving spontaneously  Neurological- no focal deficits     Assessment/Plan: 47y Female  s/p   - Diet: regular  - Activity- OOB with assistance  - Pain medication as needed   - DVT ppx  - incentive spirometry   - Patient is tachycardic and hypotensive at baseline Surgery Post op Note    Procedure: bilateral total mastectomy    SUBJECTIVE: Patient seen and evaluated at the bedside. Patient reported feeling slightly dizzy and had appropriate, mild left axillary pain.     SOB:  [ ] YES [X ] NO  Chest Discomfort: [ ] YES [X ] NO    Nausea: [ ] YES [X ] NO           Vomiting: [ ] YES [ X] NO  Flatus: [ ] YES [X ] NO             Bowel Movement: [ ] YES [X] NO  Void: [X ]YES [ ]No         Pain Control Adequate: [X ] YES [ ] NO    Objective:   Vital Signs Last 24 Hrs  T(C): 36.8 (01 Dec 2022 18:55), Max: 37 (01 Dec 2022 16:00)  T(F): 98.2 (01 Dec 2022 18:55), Max: 98.6 (01 Dec 2022 16:00)  HR: 102 (01 Dec 2022 18:55) (88 - 110)  BP: 106/78 (01 Dec 2022 18:55) (90/65 - 115/82)  BP(mean): 71 (01 Dec 2022 18:00) (66 - 84)  RR: 16 (01 Dec 2022 18:55) (11 - 23)  SpO2: 98% (01 Dec 2022 18:55) (93% - 100%)    Parameters below as of 01 Dec 2022 18:55  Patient On (Oxygen Delivery Method): room air      I&O's Summary    01 Dec 2022 07:01  -  01 Dec 2022 19:01  --------------------------------------------------------  IN: 875 mL / OUT: 1530 mL / NET: -655 mL      I&O's Detail    01 Dec 2022 07:01  -  01 Dec 2022 19:01  --------------------------------------------------------  IN:    IV PiggyBack: 350 mL    Lactated Ringers: 375 mL    Oral Fluid: 150 mL  Total IN: 875 mL    OUT:    Bulb (mL): 28 mL    Bulb (mL): 5 mL    Bulb (mL): 34 mL    Bulb (mL): 13 mL    Voided (mL): 1450 mL  Total OUT: 1530 mL    Total NET: -655 mL            LABS:                MEDICATIONS  (STANDING):  acetaminophen   IVPB .. 1000 milliGRAM(s) IV Intermittent every 6 hours    MEDICATIONS  (PRN):  oxyCODONE    IR 5 milliGRAM(s) Oral every 6 hours PRN Moderate Pain (4 - 6)  oxyCODONE    IR 10 milliGRAM(s) Oral every 6 hours PRN Severe Pain (7 - 10)      Physical exam  General- no acute distress, resting  Abdomen- soft, non-tender, non-distended  Chest- surgical bra in place. Dressing without strikethrough or collections. SHOAIB x2 in place on left with ss output, SHOAIB x2 in place on right with ss output.  Respiratory- unlabored respirations  Extremities- moving spontaneously  Neurological- no focal deficits     Assessment/Plan: 47y Female  s/p   - Diet: regular  - Activity- OOB with assistance  - Pain medication as needed   - DVT ppx  - incentive spirometry   - Patient is tachycardic and hypotensive at baseline    N.B. Post-op check was performed at approximately 5:30pm

## 2022-12-02 ENCOUNTER — TRANSCRIPTION ENCOUNTER (OUTPATIENT)
Age: 47
End: 2022-12-02

## 2022-12-02 VITALS
RESPIRATION RATE: 17 BRPM | SYSTOLIC BLOOD PRESSURE: 112 MMHG | HEART RATE: 81 BPM | TEMPERATURE: 98 F | OXYGEN SATURATION: 100 % | DIASTOLIC BLOOD PRESSURE: 77 MMHG

## 2022-12-02 LAB
ANION GAP SERPL CALC-SCNC: 6 MMOL/L — LOW (ref 7–14)
BUN SERPL-MCNC: 9 MG/DL — SIGNIFICANT CHANGE UP (ref 7–23)
CALCIUM SERPL-MCNC: 9.2 MG/DL — SIGNIFICANT CHANGE UP (ref 8.4–10.5)
CHLORIDE SERPL-SCNC: 104 MMOL/L — SIGNIFICANT CHANGE UP (ref 98–107)
CO2 SERPL-SCNC: 25 MMOL/L — SIGNIFICANT CHANGE UP (ref 22–31)
CREAT SERPL-MCNC: 0.71 MG/DL — SIGNIFICANT CHANGE UP (ref 0.5–1.3)
EGFR: 104 ML/MIN/1.73M2 — SIGNIFICANT CHANGE UP
GLUCOSE SERPL-MCNC: 102 MG/DL — HIGH (ref 70–99)
HCT VFR BLD CALC: 30.9 % — LOW (ref 34.5–45)
HCT VFR BLD CALC: 34.7 % — SIGNIFICANT CHANGE UP (ref 34.5–45)
HGB BLD-MCNC: 10.2 G/DL — LOW (ref 11.5–15.5)
HGB BLD-MCNC: 11.3 G/DL — LOW (ref 11.5–15.5)
MAGNESIUM SERPL-MCNC: 2 MG/DL — SIGNIFICANT CHANGE UP (ref 1.6–2.6)
MCHC RBC-ENTMCNC: 31 PG — SIGNIFICANT CHANGE UP (ref 27–34)
MCHC RBC-ENTMCNC: 31.3 PG — SIGNIFICANT CHANGE UP (ref 27–34)
MCHC RBC-ENTMCNC: 32.6 GM/DL — SIGNIFICANT CHANGE UP (ref 32–36)
MCHC RBC-ENTMCNC: 33 GM/DL — SIGNIFICANT CHANGE UP (ref 32–36)
MCV RBC AUTO: 93.9 FL — SIGNIFICANT CHANGE UP (ref 80–100)
MCV RBC AUTO: 96.1 FL — SIGNIFICANT CHANGE UP (ref 80–100)
NRBC # BLD: 0 /100 WBCS — SIGNIFICANT CHANGE UP (ref 0–0)
NRBC # BLD: 0 /100 WBCS — SIGNIFICANT CHANGE UP (ref 0–0)
NRBC # FLD: 0 K/UL — SIGNIFICANT CHANGE UP (ref 0–0)
NRBC # FLD: 0 K/UL — SIGNIFICANT CHANGE UP (ref 0–0)
PHOSPHATE SERPL-MCNC: 4.7 MG/DL — HIGH (ref 2.5–4.5)
PLATELET # BLD AUTO: 297 K/UL — SIGNIFICANT CHANGE UP (ref 150–400)
PLATELET # BLD AUTO: 326 K/UL — SIGNIFICANT CHANGE UP (ref 150–400)
POTASSIUM SERPL-MCNC: 4 MMOL/L — SIGNIFICANT CHANGE UP (ref 3.5–5.3)
POTASSIUM SERPL-SCNC: 4 MMOL/L — SIGNIFICANT CHANGE UP (ref 3.5–5.3)
RBC # BLD: 3.29 M/UL — LOW (ref 3.8–5.2)
RBC # BLD: 3.61 M/UL — LOW (ref 3.8–5.2)
RBC # FLD: 13.2 % — SIGNIFICANT CHANGE UP (ref 10.3–14.5)
RBC # FLD: 13.5 % — SIGNIFICANT CHANGE UP (ref 10.3–14.5)
SODIUM SERPL-SCNC: 135 MMOL/L — SIGNIFICANT CHANGE UP (ref 135–145)
WBC # BLD: 6.32 K/UL — SIGNIFICANT CHANGE UP (ref 3.8–10.5)
WBC # BLD: 7.77 K/UL — SIGNIFICANT CHANGE UP (ref 3.8–10.5)
WBC # FLD AUTO: 6.32 K/UL — SIGNIFICANT CHANGE UP (ref 3.8–10.5)
WBC # FLD AUTO: 7.77 K/UL — SIGNIFICANT CHANGE UP (ref 3.8–10.5)

## 2022-12-02 RX ORDER — METOPROLOL TARTRATE 50 MG
25 TABLET ORAL DAILY
Refills: 0 | Status: DISCONTINUED | OUTPATIENT
Start: 2022-12-02 | End: 2022-12-02

## 2022-12-02 RX ORDER — ATORVASTATIN CALCIUM 80 MG/1
10 TABLET, FILM COATED ORAL AT BEDTIME
Refills: 0 | Status: DISCONTINUED | OUTPATIENT
Start: 2022-12-02 | End: 2022-12-02

## 2022-12-02 RX ORDER — ACETAMINOPHEN 500 MG
100 TABLET ORAL
Qty: 0 | Refills: 0 | DISCHARGE
Start: 2022-12-02

## 2022-12-02 RX ORDER — LEVOTHYROXINE SODIUM 125 MCG
75 TABLET ORAL DAILY
Refills: 0 | Status: DISCONTINUED | OUTPATIENT
Start: 2022-12-02 | End: 2022-12-02

## 2022-12-02 RX ORDER — FENOFIBRATE,MICRONIZED 130 MG
1 CAPSULE ORAL
Qty: 0 | Refills: 0 | DISCHARGE

## 2022-12-02 RX ORDER — OXYCODONE HYDROCHLORIDE 5 MG/1
1 TABLET ORAL
Qty: 8 | Refills: 0
Start: 2022-12-02 | End: 2022-12-03

## 2022-12-02 RX ORDER — ACETAMINOPHEN 500 MG
2 TABLET ORAL
Qty: 32 | Refills: 0
Start: 2022-12-02 | End: 2022-12-05

## 2022-12-02 RX ORDER — CHOLECALCIFEROL (VITAMIN D3) 125 MCG
1 CAPSULE ORAL
Qty: 0 | Refills: 0 | DISCHARGE

## 2022-12-02 RX ADMIN — Medication 1000 MILLIGRAM(S): at 06:15

## 2022-12-02 RX ADMIN — Medication 1000 MILLIGRAM(S): at 14:00

## 2022-12-02 RX ADMIN — Medication 1000 MILLIGRAM(S): at 00:35

## 2022-12-02 RX ADMIN — Medication 25 MILLIGRAM(S): at 09:07

## 2022-12-02 RX ADMIN — Medication 400 MILLIGRAM(S): at 05:43

## 2022-12-02 RX ADMIN — Medication 400 MILLIGRAM(S): at 00:05

## 2022-12-02 RX ADMIN — Medication 75 MICROGRAM(S): at 05:43

## 2022-12-02 RX ADMIN — Medication 400 MILLIGRAM(S): at 13:42

## 2022-12-02 NOTE — DISCHARGE NOTE NURSING/CASE MANAGEMENT/SOCIAL WORK - NSDCPEFALRISK_GEN_ALL_CORE
For information on Fall & Injury Prevention, visit: [email or web address] OR  [email or web address] OR  https://www.cdc.gov/steadi/patient.html

## 2022-12-02 NOTE — DISCHARGE NOTE NURSING/CASE MANAGEMENT/SOCIAL WORK - NSDCPECAREGIVERED_GEN_ALL_CORE
pt ambulating, eating, voiding without difficulty. iv discontinued. no distress noted. patient has bilateral breast staples with dressing and surgical bra in place. SHOAIB emptied and patient daughter given SHOAIB drain teaching. both verbalized understanding of discharge. patient given supplies./No

## 2022-12-02 NOTE — DISCHARGE NOTE PROVIDER - HOSPITAL COURSE
_ diagnosed with _ underwent _ in the OR. The patient tolerated the procedure well. Post-operatively the patient was sent to the PACU. The patient was hemodynamically stable and was transferred to a surgical floor. Patient tolerated operation well and there were no post operative complications identified. The patient's pain was controlled by IV pain medications and then by PO pain medications. The patient was advanced to a regular diet and tolerated it well. The patient was placed on home medications. At the time of discharge, the patient was hemodynamically stable, was tolerating PO diet, was voiding urine and passing stool, was ambulating, and was comfortable with adequate pain control. The patient was instructed to follow up with Dr. Dixon within 2 weeks after discharge from the hospital. The patient/family felt comfortable with discharge. The patient had 47 year old female with PMH of HTN, Hypothyroidism, with pre op diagnosis of malignant neoplasm of left female, for pre op evaluation prior to scheduled surgery s/p bilateral mastectomy L SLNB, R axillary dissection on 12/1/22. The patient tolerated the procedure well. Post-operatively the patient was sent to the PACU. The patient was hemodynamically stable and was transferred to a surgical floor. Patient tolerated operation well and there were no post operative complications identified. The patient's pain was controlled by IV pain medications and then by PO pain medications. The patient was advanced to a regular diet and tolerated it well. The patient was placed on home medications. At the time of discharge, the patient was hemodynamically stable, was tolerating PO diet, was voiding urine and passing stool, was ambulating, and was comfortable with adequate pain control. The patient was instructed to follow up with Dr. Dixon within 2 weeks after discharge from the hospital. The patient received archie drain teaching prior to discharge. The patient/family felt comfortable with discharge. The patient had

## 2022-12-02 NOTE — DISCHARGE NOTE PROVIDER - DISCHARGE SERVICE FOR PATIENT
ABDOMINAL PAIN on the discharge service for the patient. I have reviewed and made amendments to the documentation where necessary.

## 2022-12-02 NOTE — PROGRESS NOTE ADULT - ASSESSMENT
47F PMH HTN, Hypothyroidism, breast cancer s/p Bilateral mastectomy with left SLNB, R axillary dissection.     PLAN:  - f/u AM CBC  - Potential D/C this PM  - Pain control  - Monitor SHOAIB output    A team  [id number]

## 2022-12-02 NOTE — DISCHARGE NOTE NURSING/CASE MANAGEMENT/SOCIAL WORK - PATIENT PORTAL LINK FT
You can access the FollowMyHealth Patient Portal offered by [facility] by registering at the following website: [email or web address]. By joining [email or web address]’s FollowMyHealth portal, you will also be able to view your health information using other applications (apps) compatible with our system.

## 2022-12-02 NOTE — PROGRESS NOTE ADULT - PROVIDER SPECIALTY LIST ADULT
[facility]  ID #[id number]. pt ambulatory to ED, A&O x3. hx of HTN, HLD, CAD, CHF, COPD, Afib, arthritis. presents to ED with generalized abdominal pain x8 days and bilateral LE pitting edema. denies chest pain, n/v/d, fever, urinary symptoms. endorses SOB on exertion. states he used to take blood thinners, anti-hypertensive but ran out of medication. states it has been about 3 weeks since he has taken BP medication.

## 2022-12-02 NOTE — DISCHARGE NOTE NURSING/CASE MANAGEMENT/SOCIAL WORK - NSDCPNINST_GEN_ALL_CORE
drink 9-13 eight oz. glasses of fluid daily. call md for follow up appt. call md for sign of infection (temp greater than 100.4f, redness at incision, pain not relieved by meds). SHOAIB drains x4 intact and without sign of infection.

## 2022-12-02 NOTE — DISCHARGE NOTE PROVIDER - NSDCMRMEDTOKEN_GEN_ALL_CORE_FT
acetaminophen 10 mg/mL intravenous solution: 100 milliliter(s) intravenous every 6 hours  atorvastatin 10 mg oral tablet: 1 tab(s) orally once a day    unclear if you are taking this at home, please follow up with your PCP   levothyroxine 75 mcg (0.075 mg) oral capsule: 1 cap(s) orally once a day  metoprolol tartrate 25 mg oral tablet: 1 tab(s) orally 2 times a day  oxyCODONE 5 mg oral tablet: 1 tab(s) orally every 6 hours, As needed for SEVERE pain MDD:4   atorvastatin 10 mg oral tablet: 1 tab(s) orally once a day    unclear if you are taking this at home, please follow up with your PCP   levothyroxine 75 mcg (0.075 mg) oral capsule: 1 cap(s) orally once a day  metoprolol tartrate 25 mg oral tablet: 1 tab(s) orally 2 times a day  oxyCODONE 5 mg oral tablet: 1 tab(s) orally every 6 hours, As needed for SEVERE pain MDD:4   atorvastatin 10 mg oral tablet: 1 tab(s) orally once a day    unclear if you are taking this at home, please follow up with your PCP   levothyroxine 75 mcg (0.075 mg) oral capsule: 1 cap(s) orally once a day  metoprolol tartrate 25 mg oral tablet: 1 tab(s) orally 2 times a day  oxyCODONE 5 mg oral tablet: 1 tab(s) orally every 6 hours, As needed for SEVERE pain MDD:4  Tylenol 325 mg oral tablet: 2 tab(s) orally every 6 hours, As Needed -for mild pain MDD:8 tabs daily

## 2022-12-02 NOTE — PROGRESS NOTE ADULT - SUBJECTIVE AND OBJECTIVE BOX
SURGERY DAILY PROGRESS NOTE    --------------- OVERNIGHT/INTERVAL EVENTS ---------------  - No acute events overnight     --------------- SUBJECTIVE ---------------  - Patient describes no acute issues or concerns at this time  - +/- BM +void. Denies CP, SOB, n/v, abdominal pain.   - Patient seen and examined at bedside with surgical team    --------------- OBJECTIVE ---------------    -----VITALS-----  T(C): 36.4, Max: 37 (12-01)  HR: 97 (95 - 110)  BP: 102/63 (90/65 - 110/71)  RR: 17 (11 - 23)  SpO2: 100% (93% - 100%) room air        -----PHYSICAL EXAM-----  GENERAL: NAD, lying in bed   NEURO: AOx3, awake alert appropriate  HEENT: NCAT, trachea midline  PULM: Respirations non-labored  BREAST: b/l mastectomy incisions c/d/i, no signs of hematoma   - 4x SHOAIB SSF  ABD: Soft, non-tender, non-distended, no peritonitis/rebound tenderness  EXT: Warm, well perfused    -----DRAINS-----  SHOAIB:   OUT: 177.5 mL     -----INs & OUTs-----    12-01  -  12-02  --------------------------------------------------------  IN:    IV PiggyBack: 550 mL    Lactated Ringers: 375 mL    Oral Fluid: 150 mL  Total IN: 1075 mL    OUT:    Bulb (mL): 48 mL    Bulb (mL): 30.5 mL    Bulb (mL): 79 mL    Bulb (mL): 20 mL    Voided (mL): 2850 mL  Total OUT: 3027.5 mL          -----MEDICATIONS-----  STANDING  acetaminophen   IVPB .. 1000 milliGRAM(s) IV Intermittent every 6 hours  atorvastatin 10 milliGRAM(s) Oral at bedtime  levothyroxine 75 MICROGram(s) Oral daily  metoprolol succinate ER 25 milliGRAM(s) Oral daily    PRN  oxyCODONE    IR 5 milliGRAM(s) Oral every 6 hours PRN Moderate Pain (4 - 6)  oxyCODONE    IR 10 milliGRAM(s) Oral every 6 hours PRN Severe Pain (7 - 10)      -----LABS-----  135  |  104  |  9  ----------------------------<  102<H>    (12-02)  4.0   |  25  |  0.71            Ca    9.2      12-02  Mg    2.00  Phos  4.7<H>

## 2022-12-02 NOTE — DISCHARGE NOTE PROVIDER - NSDCCPCAREPLAN_GEN_ALL_CORE_FT
PRINCIPAL DISCHARGE DIAGNOSIS  Diagnosis: Breast cancer  Assessment and Plan of Treatment: Jose R Galindo drain (SHOAIB Drain)   You are being discharged with a SHOAIB drain. It is important that you measure and record the fluid that is in it (output) on the output record sheet daily. Please bring the output record sheet to your follow-up appointment. Keep the drain secured to your clothing with a safety pin at all times to avoid accidental displacement. Monitor the insertion site for redness, pain, swelling, or purulent drainage.  You may shower with the drain. Wash around the drain with soap and water, pat dry, and reapply dressing. If you noticed a sudden change in the color or amount of fluid in the drain, please contact your surgeon’s office.  Your drain will be removed at an outpatient follow up appointment.   WOUND CARE:  Please keep incisions clean and dry. Please do not Scrub or rub incisions. Do not use lotion or powder on incisions.   BATHING: You may shower and/or sponge bathe. You may use warm soapy water in the shower and rinse, pat dry.  ACTIVITY: No heavy lifting or straining. Otherwise, you may return to your usual level of physical activity. If you are taking narcotic pain medication DO NOT drive a car, operate machinery or make important decisions.  DIET: Return to your usual diet.  NOTIFY YOUR SURGEON IF YOU HAVE: any bleeding that does not stop, any pus draining from your wound(s), any fever (over 100.4 F) persistent nausea/vomiting, or if your pain is not controlled on your discharge pain medications, unable to urinate.  Please follow up with your primary care physician in one week regarding your hospitalization, bring copies of your discharge paperwork.  Please follow up with your surgeon, Dr. Dixon within 1-2 weeks of discharge. Please call to schedule an appointment.

## 2022-12-02 NOTE — DISCHARGE NOTE PROVIDER - CARE PROVIDER_API CALL
Celeste Dixon ([facility])  Surgery  [address], [address]  [place] [address]  Phone: [phone]  Fax: [phone]  Follow Up Time: 1 week

## 2022-12-07 NOTE — H&P PST ADULT - MUSCULOSKELETAL
Assessment/Plan:I am a bit perplexed by Lolis's symptoms-not sure if they relate to a migraine or some kind of waxing and waning chondritis or something inflammatory or if it has something to do with the pituitary abnormalities-will go ahead and repeat the MRI with pituitary protocol and will also refer back to Neurology and to Rheumatology in case this is some sort of relapsing poly chondritis etc -advised her to have excedrin on hand to try, ibuprofen          Problem List Items Addressed This Visit    None  Visit Diagnoses     Abnormality of pituitary gland ([facility] [address] )    -  Primary    Relevant Orders    MRI brain pituitary wo and w contrast    Ambulatory Referral to Neurology    Polychondritis        Relevant Orders    Ambulatory Referral to Rheumatology            Subjective:      Patient ID: Sam Cheatham is a 50 y o  female  Lorena Varma here because she says the other night her left ear (on the cartilaginous portion) became very red and inflamed and she began to get throbbing on that side of her head/ear-went away ultimately then 2 days later had a similar episode prompting the visit  Of note, Lorena Varma has a hx of a left sided Bell's palsy earlier this year and also had an MRI in 2021 showing a somewhat enlarged pituitary gland, possibly an adenoma with deviation of the stalk to the left  She also has had some episodes of blurry vision but says she went to eye dr and it was ok-has seen Neurology in the past      The following portions of the patient's history were reviewed and updated as appropriate:   Past Medical History:  She has a past medical history of Abnormal Pap smear of cervix, Alcohol intoxication ([facility] [address] ) (08/19/2021), Allergies, Enlarged pituitary gland ([facility] [address] ), and Shoulder pain, right (01/18/2019)  ,  _______________________________________________________________________  Medical Problems:  does not have any pertinent problems on file ,  _______________________________________________________________________  Past Surgical History:   has a past surgical history that includes Knee surgery; Gallbladder surgery;  section; Tubal ligation; Colonoscopy (2019); Cholecystectomy (2016); Colonoscopy (2016); and Mammo (historical) (Bilateral, 2022)  ,  _______________________________________________________________________  Family History:  family history includes Cancer in her father; Heart disease in her father; Liver cancer in her maternal grandmother; Lupus in her mother; Melanoma in her father; No Known Problems in her brother, daughter, daughter, daughter, daughter, sister, sister, and son; Rheum arthritis in her mother ,  _______________________________________________________________________  Social History:   reports that she has never smoked  She has never used smokeless tobacco  She reports current alcohol use  She reports that she does not use drugs  ,  _______________________________________________________________________  Allergies:  is allergic to bactrim [sulfamethoxazole-trimethoprim] and contrast dye [iodinated diagnostic agents]     _______________________________________________________________________  No current outpatient medications on file  No current facility-administered medications for this visit      _______________________________________________________________________  Review of Systems   HENT: Positive for ear pain  Eyes: Positive for visual disturbance  Neurological: Positive for headaches  Objective:  Vitals:    22 1620   BP: 120/80   BP Location: Left arm   Patient Position: Sitting   Cuff Size: Large   Pulse: 87   Resp: 16   Temp: (!) 97 3 °F (36 3 °C)   TempSrc: Temporal   SpO2: 96%   Weight: 78 5 kg (173 lb)   Height: 5' 2" (1 575 m)     Body mass index is 31 64 kg/m²  Physical Exam  Constitutional:       Appearance: Normal appearance     HENT:      Head: Normocephalic and atraumatic  Right Ear: Tympanic membrane, ear canal and external ear normal       Left Ear: Tympanic membrane, ear canal and external ear normal       Nose: Nose normal       Mouth/Throat:      Mouth: Mucous membranes are moist    Eyes:      Extraocular Movements: Extraocular movements intact  Pupils: Pupils are equal, round, and reactive to light  Neck:      Vascular: No carotid bruit  Cardiovascular:      Rate and Rhythm: Normal rate and regular rhythm  Pulmonary:      Effort: Pulmonary effort is normal       Breath sounds: Normal breath sounds  Musculoskeletal:      Cervical back: Normal range of motion and neck supple  Neurological:      General: No focal deficit present  Mental Status: She is alert and oriented to person, place, and time  Mental status is at baseline  Psychiatric:         Mood and Affect: Mood normal          Thought Content:  Thought content normal          Judgment: Judgment normal  details… normal/ROM intact/normal gait/strength 5/5 bilateral upper extremities/strength 5/5 bilateral lower extremities

## 2022-12-08 LAB — SURGICAL PATHOLOGY STUDY: SIGNIFICANT CHANGE UP

## 2023-01-12 ENCOUNTER — APPOINTMENT (OUTPATIENT)
Dept: OBGYN | Facility: CLINIC | Age: 48
End: 2023-01-12
Payer: COMMERCIAL

## 2023-01-12 VITALS
BODY MASS INDEX: 21.01 KG/M2 | HEIGHT: 60 IN | WEIGHT: 107 LBS | DIASTOLIC BLOOD PRESSURE: 82 MMHG | SYSTOLIC BLOOD PRESSURE: 118 MMHG

## 2023-01-12 PROCEDURE — 99396 PREV VISIT EST AGE 40-64: CPT

## 2023-01-12 NOTE — HISTORY OF PRESENT ILLNESS
[FreeTextEntry1] : Patient is a 47 year old P2 presenting for an annual visit. She is feeling well and is without complaints. She denies vaginal itching, odor and discharge. Denies urinary urgency, frequency and dysuria. Pt reports she had chemo therapy done for breast cancer. She states she no longer gets menses after chemo therapy. She had a bilateral mastectomy done in December 2022. She was told by her medical oncologist to continue with radiation and advised her to go for a consultation for possible oophorectomy.  [Patient reported PAP Smear was normal] : Patient reported PAP Smear was normal [HIV Test offered] : HIV Test offered [Syphilis test offered] : Syphilis test offered [Gonorrhea test offered] : Gonorrhea test offered [Chlamydia test offered] : Chlamydia test offered [HPV test offered] : HPV test offered [Hepatitis B test offered] : Hepatitis B test offered [Hepatitis C test offered] : Hepatitis C test offered [PapSmeardate] : 10/2021

## 2023-01-12 NOTE — PLAN
[FreeTextEntry1] : 47 year old P1 presenting for annual exam  \par -uterus is 12-14 weeks size, multiple fibroids noted on unofficial scan \par -f/u PAP and GC/CT done today\par -referral given to GYN oncologist [phone] \par -pt will be continuing with radiation\par -f/u PRN \par \par

## 2023-01-12 NOTE — REVIEW OF SYSTEMS
[Patient Intake Form Reviewed] : Patient intake form was reviewed [Negative] : Heme/Lymph [de-identified] : B/L mastectomy

## 2023-01-13 LAB
C TRACH RRNA SPEC QL NAA+PROBE: NOT DETECTED
HPV HIGH+LOW RISK DNA PNL CVX: NOT DETECTED
N GONORRHOEA RRNA SPEC QL NAA+PROBE: NOT DETECTED
SOURCE AMPLIFICATION: NORMAL

## 2023-01-14 LAB
HPV 16 E6+E7 MRNA CVX QL NAA+PROBE: NOT DETECTED
HPV18+45 E6+E7 MRNA CVX QL NAA+PROBE: NOT DETECTED

## 2023-01-24 LAB — CYTOLOGY CVX/VAG DOC THIN PREP: NORMAL

## 2023-02-02 ENCOUNTER — APPOINTMENT (OUTPATIENT)
Dept: OBGYN | Facility: CLINIC | Age: 48
End: 2023-02-02
Payer: MEDICAID

## 2023-02-02 ENCOUNTER — APPOINTMENT (OUTPATIENT)
Dept: ANTEPARTUM | Facility: CLINIC | Age: 48
End: 2023-02-02
Payer: MEDICAID

## 2023-02-02 VITALS
DIASTOLIC BLOOD PRESSURE: 85 MMHG | WEIGHT: 108.13 LBS | SYSTOLIC BLOOD PRESSURE: 120 MMHG | BODY MASS INDEX: 21.12 KG/M2

## 2023-02-02 PROCEDURE — 76856 US EXAM PELVIC COMPLETE: CPT

## 2023-02-02 PROCEDURE — 99212 OFFICE O/P EST SF 10 MIN: CPT

## 2023-02-02 NOTE — HISTORY OF PRESENT ILLNESS
[Currently Active] : currently active [Men] : men [Vaginal] : vaginal [No] : No [FreeTextEntry1] : pt here for breast follow up, pt has history of breast cancer .\par pt being referred to gyn oncology\par

## 2023-02-03 PROBLEM — D25.9 FIBROID UTERUS: Status: ACTIVE | Noted: 2021-11-11

## 2023-02-07 ENCOUNTER — APPOINTMENT (OUTPATIENT)
Dept: GYNECOLOGIC ONCOLOGY | Facility: CLINIC | Age: 48
End: 2023-02-07
Payer: MEDICAID

## 2023-02-07 VITALS
BODY MASS INDEX: 21.37 KG/M2 | WEIGHT: 106 LBS | DIASTOLIC BLOOD PRESSURE: 85 MMHG | SYSTOLIC BLOOD PRESSURE: 126 MMHG | HEIGHT: 59 IN | HEART RATE: 125 BPM

## 2023-02-07 DIAGNOSIS — D25.9 LEIOMYOMA OF UTERUS, UNSPECIFIED: ICD-10-CM

## 2023-02-07 PROCEDURE — 99204 OFFICE O/P NEW MOD 45 MIN: CPT

## 2023-02-07 RX ORDER — ATENOLOL 50 MG/1
50 TABLET ORAL
Refills: 0 | Status: DISCONTINUED | COMMUNITY
End: 2023-02-07

## 2023-02-07 RX ORDER — LEVOTHYROXINE SODIUM 75 UG/1
75 CAPSULE ORAL
Refills: 0 | Status: DISCONTINUED | COMMUNITY
End: 2023-02-07

## 2023-02-20 NOTE — H&P PST ADULT - ATTENDING COMMENTS
Yasmin hein, [facility] [place] [address] [place], [place]
Since the last procedure, the patient developed bloody nipple discharge on the right. She was advised to have a right microdochectomy or retro areolar terminal duct excision to rule out a papilloma or DCIS.

## 2023-03-02 NOTE — ED ADULT NURSE NOTE - CAS ELECT INFOMATION PROVIDED
INFECTIOUS DISEASE f/u     Claudia Estrada  1957  [id number]    Date of Consult: 3/2/Amarjit Oakes MD;  Location:  Ingen Technologies [id number]    XR Chest 1 View [[id number]] [id number]    MRI Brain Without Contrast [[id number]] DC instructions

## 2023-04-14 NOTE — DISCHARGE NOTE PROVIDER - NSDCDCMDCOMP_GEN_ALL_CORE
[2+] : left 2+ [de-identified] : Appears well in no acute distress [de-identified] : Normal work of breathing [de-identified] : Bilateral lower extremities without any evidence of edema there are no obvious varicosities the phlebectomy sites are well-healed. This document is complete and the patient is ready for discharge.

## 2023-04-25 ENCOUNTER — APPOINTMENT (OUTPATIENT)
Dept: OBGYN | Facility: CLINIC | Age: 48
End: 2023-04-25
Payer: MEDICAID

## 2023-04-25 VITALS
WEIGHT: 111 LBS | BODY MASS INDEX: 22.38 KG/M2 | HEIGHT: 59 IN | DIASTOLIC BLOOD PRESSURE: 60 MMHG | SYSTOLIC BLOOD PRESSURE: 108 MMHG

## 2023-04-25 DIAGNOSIS — C50.919 MALIGNANT NEOPLASM OF UNSPECIFIED SITE OF UNSPECIFIED FEMALE BREAST: ICD-10-CM

## 2023-04-25 DIAGNOSIS — Z85.3 PERSONAL HISTORY OF MALIGNANT NEOPLASM OF BREAST: ICD-10-CM

## 2023-04-25 PROCEDURE — 99213 OFFICE O/P EST LOW 20 MIN: CPT

## 2023-04-25 NOTE — PHYSICAL EXAM
[Appropriately responsive] : appropriately responsive [Alert] : alert [No Acute Distress] : no acute distress [Soft] : soft [Non-tender] : non-tender [Non-distended] : non-distended [No HSM] : No HSM [No Lesions] : no lesions [No Mass] : no mass [Oriented x3] : oriented x3 [Right Breast Absent] : a total mastectomy [Left Breast Absent] : a total mastectomy [No Masses] : no breast masses were palpable [Labia Majora] : normal [Labia Minora] : normal [Normal] : normal [Uterine Adnexae] : normal

## 2023-04-25 NOTE — DISCUSSION/SUMMARY
[FreeTextEntry1] : - started anastrazole after radiation\par - discussed routine gyn surveillance with cancer patients; will evaluate endometrium if symptomatic\par

## 2023-09-05 ENCOUNTER — OUTPATIENT (OUTPATIENT)
Dept: OUTPATIENT SERVICES | Facility: HOSPITAL | Age: 48
LOS: 1 days | End: 2023-09-05
Payer: COMMERCIAL

## 2023-09-05 ENCOUNTER — APPOINTMENT (OUTPATIENT)
Dept: ULTRASOUND IMAGING | Facility: CLINIC | Age: 48
End: 2023-09-05
Payer: COMMERCIAL

## 2023-09-05 DIAGNOSIS — Z98.891 HISTORY OF UTERINE SCAR FROM PREVIOUS SURGERY: Chronic | ICD-10-CM

## 2023-09-05 DIAGNOSIS — Z98.890 OTHER SPECIFIED POSTPROCEDURAL STATES: Chronic | ICD-10-CM

## 2023-09-05 DIAGNOSIS — E03.9 HYPOTHYROIDISM, UNSPECIFIED: ICD-10-CM

## 2023-09-05 DIAGNOSIS — E06.9 THYROIDITIS, UNSPECIFIED: ICD-10-CM

## 2023-09-05 PROCEDURE — 76536 US EXAM OF HEAD AND NECK: CPT | Mod: 26

## 2023-09-05 PROCEDURE — 76536 US EXAM OF HEAD AND NECK: CPT

## 2023-09-19 NOTE — ASU PREOP CHECKLIST - BOWEL PREP
Valencia Ye completed full PFTs  with BD in lab, patient left without complaint or distress. CMW   n/a

## 2023-10-10 ENCOUNTER — APPOINTMENT (OUTPATIENT)
Dept: OBGYN | Facility: CLINIC | Age: 48
End: 2023-10-10
Payer: COMMERCIAL

## 2023-10-10 VITALS
BODY MASS INDEX: 22.18 KG/M2 | WEIGHT: 110 LBS | HEIGHT: 59 IN | SYSTOLIC BLOOD PRESSURE: 110 MMHG | DIASTOLIC BLOOD PRESSURE: 62 MMHG

## 2023-10-10 DIAGNOSIS — Z79.811 ENCOUNTER FOR THERAPEUTIC DRUG LVL MONITORING: ICD-10-CM

## 2023-10-10 DIAGNOSIS — Z51.81 ENCOUNTER FOR THERAPEUTIC DRUG LVL MONITORING: ICD-10-CM

## 2023-10-10 PROBLEM — E03.9 HYPOTHYROIDISM, UNSPECIFIED: Chronic | Status: ACTIVE | Noted: 2022-11-14

## 2023-10-10 PROBLEM — I10 ESSENTIAL (PRIMARY) HYPERTENSION: Chronic | Status: ACTIVE | Noted: 2022-11-14

## 2023-10-10 PROBLEM — C50.919 MALIGNANT NEOPLASM OF UNSPECIFIED SITE OF UNSPECIFIED FEMALE BREAST: Chronic | Status: ACTIVE | Noted: 2022-11-14

## 2023-10-10 PROBLEM — E78.5 HYPERLIPIDEMIA, UNSPECIFIED: Chronic | Status: ACTIVE | Noted: 2022-11-14

## 2023-10-10 PROCEDURE — 99213 OFFICE O/P EST LOW 20 MIN: CPT

## 2023-10-18 NOTE — ED ADULT NURSE NOTE - CAS ELECT INFOMATION PROVIDED
----- Message from Estefani Randall sent at 10/18/2023 11:55 AM CDT -----  Contact: Patient  Patient called to consult with nurse or staff regarding an appointment. She states she wanted to speak with clinic regarding this and about a surgery she is needing to schedule. She states she left a message on yesterday but hasn't been contacted. Patient would like a call back and can be reached at [phone]. Thanks/MR    
Attempted to contact patient, no answer. Left patient voice mail informing her that her request was sent to the surgery scheduler. If she hasn't heard from her by the end of the week, patient is to call our office back.  
DC instructions

## 2023-10-20 NOTE — PROGRESS NOTE ADULT - PROBLEM SELECTOR PLAN 3
Brook, patient called, she said that she had a catheter put in yesterday, she has a really bad UIT today; she asked if she can have anything sent it today for it. Please call her back as soon as possible  
Started chemotherapy this week (cytox, doxorubicin), received Neulasta 2 days prior to admission  Chemo port placed about a week prior to admission; no erythema or purulence on exam  -outpatient oncology f/u
Started chemotherapy this week (cytox, doxorubicin), received Neulasta 2 days prior to admission  Chemo port placed about a week prior to admission; no erythema or purulence on exam  -outpatient oncology f/u

## 2024-01-08 ENCOUNTER — APPOINTMENT (OUTPATIENT)
Dept: OBGYN | Facility: CLINIC | Age: 49
End: 2024-01-08
Payer: COMMERCIAL

## 2024-01-08 VITALS
WEIGHT: 110 LBS | HEIGHT: 59 IN | SYSTOLIC BLOOD PRESSURE: 102 MMHG | BODY MASS INDEX: 22.18 KG/M2 | DIASTOLIC BLOOD PRESSURE: 60 MMHG

## 2024-01-08 DIAGNOSIS — Z01.419 ENCOUNTER FOR GYNECOLOGICAL EXAMINATION (GENERAL) (ROUTINE) W/OUT ABNORMAL FINDINGS: ICD-10-CM

## 2024-01-08 PROCEDURE — 99396 PREV VISIT EST AGE 40-64: CPT

## 2024-01-08 NOTE — DISCUSSION/SUMMARY
[FreeTextEntry1] : - Pap/HPV obtained today - s/p Breast MRI a few months ago - Colonoscopy screening up to date

## 2024-01-08 NOTE — PHYSICAL EXAM
[Appropriately responsive] : appropriately responsive [Alert] : alert [No Acute Distress] : no acute distress [Soft] : soft [Non-tender] : non-tender [Non-distended] : non-distended [No HSM] : No HSM [No Lesions] : no lesions [No Mass] : no mass [Oriented x3] : oriented x3 [No Masses] : no breast masses were palpable [Right Breast Absent] : a total mastectomy [Left Breast Absent] : a total mastectomy [Labia Majora] : normal [Labia Minora] : normal [Normal] : normal [Uterine Adnexae] : normal

## 2024-01-08 NOTE — HISTORY OF PRESENT ILLNESS
[FreeTextEntry1] : 47yo P1 postmenopausal here for annual exam.  H/o breast CA s/p b/l mastectomy and Chemo/RT on Anastrazole.  No complaints. [No] : Patient does not have concerns regarding sex [Previously active] : previously active

## 2024-01-11 LAB
C TRACH RRNA SPEC QL NAA+PROBE: NOT DETECTED
CYTOLOGY CVX/VAG DOC THIN PREP: NORMAL
HPV HIGH+LOW RISK DNA PNL CVX: NOT DETECTED
N GONORRHOEA RRNA SPEC QL NAA+PROBE: NOT DETECTED
SOURCE AMPLIFICATION: NORMAL
SOURCE TP AMPLIFICATION: NORMAL
T VAGINALIS RRNA SPEC QL NAA+PROBE: NOT DETECTED

## 2024-01-18 NOTE — ED CDU PROVIDER DISPOSITION NOTE - ATTENDING CONTRIBUTION TO CARE
Patient is a 47F w/ h/o breast cancer on chemo, HTN, HLD, Hypothyroid, who presented with shortness of breath, palpitations, dizziness with change in position, exertional dyspnea. Patient had a CTA of head, neck and chest without acute pathology. No PE on work up. Patient in CDU for echo and cardiology recs and symptomatic treatment. Patient reports she went to her cardiologist last week, had an echo that was normal at that time. No fevers.     VS noted  Gen. no acute distress, chronically ill  HEENT: EOMI, mmm  Lungs: CTAB/L no C/ W /R   CVS: tachycardic  Abd; Soft non tender, non distended   Ext: no edema, no calf tenderness  Skin: no rash  Neuro AAOx3 non focal clear speech  a/p: tachycardia, palpitations, dizziness, FARRAR - neg work up thus far. Echo wnl. Seen by Dr. Riley, plan for d/c with outpatient follow up with oncology.  - Chandra CONLEY.
yes

## 2024-02-15 ENCOUNTER — NON-APPOINTMENT (OUTPATIENT)
Age: 49
End: 2024-02-15

## 2024-06-10 ENCOUNTER — APPOINTMENT (OUTPATIENT)
Dept: OBGYN | Facility: CLINIC | Age: 49
End: 2024-06-10
Payer: COMMERCIAL

## 2024-06-10 VITALS
HEIGHT: 59 IN | SYSTOLIC BLOOD PRESSURE: 110 MMHG | WEIGHT: 115 LBS | DIASTOLIC BLOOD PRESSURE: 62 MMHG | BODY MASS INDEX: 23.18 KG/M2

## 2024-06-10 DIAGNOSIS — R10.2 PELVIC AND PERINEAL PAIN: ICD-10-CM

## 2024-06-10 PROCEDURE — 99213 OFFICE O/P EST LOW 20 MIN: CPT

## 2024-06-10 NOTE — PHYSICAL EXAM
[Appropriately responsive] : appropriately responsive [Alert] : alert [No Acute Distress] : no acute distress [Soft] : soft [Non-tender] : non-tender [Non-distended] : non-distended [No HSM] : No HSM [No Lesions] : no lesions [No Mass] : no mass [Oriented x3] : oriented x3 [Labia Majora] : normal [Labia Minora] : normal [Atrophy] : atrophy [Normal] : normal [Uterine Adnexae] : normal

## 2024-06-10 NOTE — HISTORY OF PRESENT ILLNESS
[FreeTextEntry1] : 49yo here w/episodic L-sided pelvic pain.  No other complaints. No vaginal bleeding.

## 2024-06-13 ENCOUNTER — APPOINTMENT (OUTPATIENT)
Dept: ULTRASOUND IMAGING | Facility: CLINIC | Age: 49
End: 2024-06-13

## 2024-06-13 ENCOUNTER — RESULT REVIEW (OUTPATIENT)
Age: 49
End: 2024-06-13

## 2024-06-13 PROCEDURE — 76856 US EXAM PELVIC COMPLETE: CPT

## 2024-06-13 PROCEDURE — 76830 TRANSVAGINAL US NON-OB: CPT

## 2024-06-14 ENCOUNTER — NON-APPOINTMENT (OUTPATIENT)
Age: 49
End: 2024-06-14

## 2024-07-03 ENCOUNTER — APPOINTMENT (OUTPATIENT)
Dept: OBGYN | Facility: CLINIC | Age: 49
End: 2024-07-03
Payer: COMMERCIAL

## 2024-07-03 VITALS
WEIGHT: 111 LBS | DIASTOLIC BLOOD PRESSURE: 70 MMHG | HEIGHT: 59 IN | SYSTOLIC BLOOD PRESSURE: 108 MMHG | BODY MASS INDEX: 22.38 KG/M2

## 2024-07-03 DIAGNOSIS — D25.9 LEIOMYOMA OF UTERUS, UNSPECIFIED: ICD-10-CM

## 2024-07-03 PROCEDURE — 99214 OFFICE O/P EST MOD 30 MIN: CPT

## 2024-07-15 ENCOUNTER — APPOINTMENT (OUTPATIENT)
Dept: ULTRASOUND IMAGING | Facility: CLINIC | Age: 49
End: 2024-07-15

## 2024-07-15 PROCEDURE — 76536 US EXAM OF HEAD AND NECK: CPT

## 2024-08-15 NOTE — ASU PATIENT PROFILE, ADULT - HAVE YOU HAD COVID IN THE LAST 60 DAYS?
August 15, 2024     Patient: Fracisco Reynoso   YOB: 1997   Date of Visit: 8/15/2024       To Whom It May Concern:    It is my medical opinion that Fracisco Reynoso may return to work on Monday 8/19/24 with no restrictions    If you have any questions or concerns, please don't hesitate to call.         Sincerely,        Mateusz Catherine MD    CC: No Recipients  
No

## 2024-10-22 ENCOUNTER — OUTPATIENT (OUTPATIENT)
Dept: OUTPATIENT SERVICES | Facility: HOSPITAL | Age: 49
LOS: 1 days | End: 2024-10-22

## 2024-10-22 VITALS
HEART RATE: 67 BPM | TEMPERATURE: 98 F | HEIGHT: 57 IN | WEIGHT: 115.08 LBS | RESPIRATION RATE: 18 BRPM | DIASTOLIC BLOOD PRESSURE: 79 MMHG | OXYGEN SATURATION: 98 % | SYSTOLIC BLOOD PRESSURE: 111 MMHG

## 2024-10-22 DIAGNOSIS — Z98.890 OTHER SPECIFIED POSTPROCEDURAL STATES: Chronic | ICD-10-CM

## 2024-10-22 DIAGNOSIS — Z98.891 HISTORY OF UTERINE SCAR FROM PREVIOUS SURGERY: Chronic | ICD-10-CM

## 2024-10-22 DIAGNOSIS — C50.919 MALIGNANT NEOPLASM OF UNSPECIFIED SITE OF UNSPECIFIED FEMALE BREAST: ICD-10-CM

## 2024-10-22 DIAGNOSIS — Z90.13 ACQUIRED ABSENCE OF BILATERAL BREASTS AND NIPPLES: Chronic | ICD-10-CM

## 2024-10-22 DIAGNOSIS — E03.9 HYPOTHYROIDISM, UNSPECIFIED: ICD-10-CM

## 2024-10-22 PROBLEM — I10 ESSENTIAL (PRIMARY) HYPERTENSION: Chronic | Status: INACTIVE | Noted: 2022-06-28 | Resolved: 2024-10-22

## 2024-10-22 NOTE — H&P PST ADULT - HISTORY OF PRESENT ILLNESS
49 year old female with pmhx of HTN, HLD, Left breast cancer s/p bilateral mastectomy, s/p Chemotherapy (12 sessions) and RT ( (2022), Hypothyroidism, Adrenal adenoma, presents for pre-op evaluation diagnosis code of Malignant neoplasm unspecified site, unspecified female breast. Per patient she had port since June 2022. Denies swelling, redness, pain on site, fever, cp, sob, hurt, dizziness, palpitations, n/v/d/c.  49 year old female with pmhx of HTN, HLD, Left breast cancer s/p bilateral mastectomy, s/p Chemotherapy (12 sessions) and RT (2022), Hypothyroidism, Adrenal adenoma, presents for pre-op evaluation diagnosis code of Malignant neoplasm unspecified site, unspecified female breast. Per patient she had port since June 2022. Denies swelling, redness, pain on site, fever, cp, sob, hurt, dizziness, palpitations, n/v/d/c.

## 2024-10-22 NOTE — H&P PST ADULT - PROBLEM SELECTOR PLAN 1
Patient tentatively scheduled for Removal of power port breast cancer on 10/25/24.  Pre-op instructions provided. Pt given verbal and written instructions with teach back on chlorhexidine shampoo and pepcid. Pt verbalized understanding with return demonstration.     Copy of labs CBC, CMP on HIE.  Endocrinology evaluation for history of adrenal adenoma. Patient tentatively scheduled for Removal of power port breast cancer on 10/25/24.  Pre-op instructions provided. Pt given verbal and written instructions with teach back on chlorhexidine shampoo and pepcid. Pt verbalized understanding with return demonstration.     Copy of labs CBC, CMP on HIIE  Urine Metanephrine and Normetanephrine results in HIE. Patient tentatively scheduled for Removal of power port breast cancer on 10/25/24.  Pre-op instructions provided. Pt given verbal and written instructions with teach back on chlorhexidine shampoo and pepcid. Pt verbalized understanding with return demonstration.     Copy of labs CBC, CMP on HIIE  Hx of Adrenal Adenoma-Urine Catecholamine, Metanephrine and Normetanephrine results in HIE.

## 2024-10-22 NOTE — H&P PST ADULT - HEMATOLOGY/LYMPHATICS
Laceration to right second and fourth digit after punching glass. Pt is unaware of tetanus status.
details…

## 2024-10-22 NOTE — H&P PST ADULT - OTHER CARE PROVIDERS
Endocrinologist- Dr. Dwain Moss- [phone]                 Cardiologists- Dr. Gonzalez-  [phone]

## 2024-10-22 NOTE — H&P PST ADULT - NSICDXPASTMEDICALHX_GEN_ALL_CORE_FT
PAST MEDICAL HISTORY:  Benign breast lumps     Breast cancer     Breast lump     Burn of skin Right axilla    Fibroids     History of adrenal adenoma     HLD (hyperlipidemia)     HTN (hypertension)     Hypothyroid     Right eye injury at age 8 (pupil irregular)

## 2024-10-22 NOTE — H&P PST ADULT - NEGATIVE ENMT SYMPTOMS
Denies dentures. Denies loose teeth./no hearing difficulty/no ear pain/no tinnitus/no vertigo/no sinus symptoms/no nasal congestion/no nasal discharge/no nasal obstruction/no dry mouth/no throat pain/no dysphagia

## 2024-10-22 NOTE — H&P PST ADULT - SKIN COMMENTS
pre-op dX:  Malignant neoplasm unspecified site, unspecified female breast. pre-op dx:  Malignant neoplasm unspecified site, unspecified female breast.

## 2024-10-22 NOTE — H&P PST ADULT - NSICDXPASTSURGICALHX_GEN_ALL_CORE_FT
PAST SURGICAL HISTORY:  H/O bilateral mastectomy     H/O eye surgery right eye surgery after eye injury (at age 8 y/o)    H/O myomectomy     H/O:  section     S/P breast lumpectomy right 15

## 2024-10-25 ENCOUNTER — OUTPATIENT (OUTPATIENT)
Dept: INPATIENT UNIT | Facility: HOSPITAL | Age: 49
LOS: 1 days | Discharge: HOME | End: 2024-10-25
Payer: COMMERCIAL

## 2024-10-25 ENCOUNTER — TRANSCRIPTION ENCOUNTER (OUTPATIENT)
Age: 49
End: 2024-10-25

## 2024-10-25 VITALS
WEIGHT: 115.08 LBS | HEART RATE: 65 BPM | OXYGEN SATURATION: 100 % | DIASTOLIC BLOOD PRESSURE: 72 MMHG | RESPIRATION RATE: 16 BRPM | SYSTOLIC BLOOD PRESSURE: 103 MMHG | TEMPERATURE: 98 F | HEIGHT: 57 IN

## 2024-10-25 VITALS
SYSTOLIC BLOOD PRESSURE: 97 MMHG | TEMPERATURE: 97 F | OXYGEN SATURATION: 100 % | RESPIRATION RATE: 16 BRPM | HEART RATE: 73 BPM | DIASTOLIC BLOOD PRESSURE: 64 MMHG

## 2024-10-25 DIAGNOSIS — Z98.890 OTHER SPECIFIED POSTPROCEDURAL STATES: Chronic | ICD-10-CM

## 2024-10-25 DIAGNOSIS — C50.919 MALIGNANT NEOPLASM OF UNSPECIFIED SITE OF UNSPECIFIED FEMALE BREAST: ICD-10-CM

## 2024-10-25 DIAGNOSIS — Z90.13 ACQUIRED ABSENCE OF BILATERAL BREASTS AND NIPPLES: Chronic | ICD-10-CM

## 2024-10-25 DIAGNOSIS — Z98.891 HISTORY OF UTERINE SCAR FROM PREVIOUS SURGERY: Chronic | ICD-10-CM

## 2024-10-25 PROCEDURE — 88300 SURGICAL PATH GROSS: CPT | Mod: 26

## 2024-10-25 RX ORDER — FENOFIBRATE 145 MG/1
1 TABLET, FILM COATED ORAL
Refills: 0 | DISCHARGE

## 2024-10-25 RX ORDER — FENTANYL CITRAT/DEXTROSE 5%/PF 1250MCG/50
50 PATIENT CONTROLLED ANALGESIA SYRINGE INTRAVENOUS
Refills: 0 | Status: DISCONTINUED | OUTPATIENT
Start: 2024-10-25 | End: 2024-10-25

## 2024-10-25 RX ORDER — ONDANSETRON HYDROCHLORIDE 2 MG/ML
4 INJECTION, SOLUTION INTRAMUSCULAR; INTRAVENOUS ONCE
Refills: 0 | Status: ACTIVE | OUTPATIENT
Start: 2024-10-25 | End: 2025-09-23

## 2024-10-25 RX ORDER — METOPROLOL TARTRATE 50 MG
1 TABLET ORAL
Refills: 0 | DISCHARGE

## 2024-10-25 RX ORDER — FENTANYL CITRAT/DEXTROSE 5%/PF 1250MCG/50
25 PATIENT CONTROLLED ANALGESIA SYRINGE INTRAVENOUS
Refills: 0 | Status: DISCONTINUED | OUTPATIENT
Start: 2024-10-25 | End: 2024-10-25

## 2024-10-25 RX ORDER — ANASTROZOLE 1 MG/1
1 TABLET ORAL
Refills: 0 | DISCHARGE

## 2024-10-25 RX ADMIN — Medication 1200 MILLILITER(S): at 13:39

## 2024-10-25 NOTE — ASU DISCHARGE PLAN (ADULT/PEDIATRIC) - ASU DC SPECIAL INSTRUCTIONSFT
Please keep incisions dry for 24hrs, after you may shower. Allow water to run over the area and gently wash, pat dry and no not rub.    For pain you may take tylenol and ibuprofen alternating every 6 hours as needed.    Please call the office to schedule a follow-up appointment.

## 2024-10-25 NOTE — BRIEF OPERATIVE NOTE - OPERATION/FINDINGS
Removal of R chest powerport. Port removed in its entirety. Hemostasis achieved. Skin closed with 3-0 chromic, 4-0 monocryl.

## 2024-10-25 NOTE — ASU DISCHARGE PLAN (ADULT/PEDIATRIC) - NURSING INSTRUCTIONS
You were given 700mg IV Tylenol for pain management at 12:30pm .  Please DO NOT take any Tylenol containing products, such as  Vicodin, Percocet, Excedrin, many cold preparations for the next 6 hours (until 6:30PM).  DO NOT EXCEED 4000MG OF TYLENOL OVER 24 HOURS.

## 2024-10-25 NOTE — ASU PREOP CHECKLIST - 1.
Due to multifocal pneumonia  Patient requires up to 4 L of oxygen at this time and saturates in low 90s or high 80s  Just started on broad-spectrum antibiotics this morning  Expect improvement with the antibiotic therapy  Supportive management at this time  Warm blanket

## 2024-10-25 NOTE — ASU DISCHARGE PLAN (ADULT/PEDIATRIC) - FINANCIAL ASSISTANCE
[facility] provides services at a reduced cost to those who are determined to be eligible through [facility]’s financial assistance program. Information regarding [facility]’s financial assistance program can be found by going to [email or web address] or by calling [phone].

## 2024-10-25 NOTE — ASU DISCHARGE PLAN (ADULT/PEDIATRIC) - CARE PROVIDER_API CALL
Robe Dixon  Surgery  [address], [address]  [place] [address]  Phone: [phone]  Fax: [phone]  Follow Up Time: 2 weeks

## 2024-10-30 LAB — SURGICAL PATHOLOGY STUDY: SIGNIFICANT CHANGE UP

## 2025-03-03 NOTE — ASU PREOP CHECKLIST - INTERNAL PROSTHESES
Labor Epidural      Patient reassessed immediately prior to procedure    Patient location during procedure: OB  Start Time: 3/3/2025 5:25 PM  Stop Time: 3/3/2025 5:41 PM  Indication:at surgeon's request  Performed By  CRNA/DANNA: Arturo Luke CRNA  Preanesthetic Checklist  Completed: patient identified, IV checked, site marked, risks and benefits discussed, surgical consent, monitors and equipment checked, pre-op evaluation and timeout performed  Additional Notes  Pt spine with VALERIY  Prep:  Pt Position:sitting  Sterile Tech:cap, gloves, mask and sterile barrier  Prep:povidone-iodine 7.5% surgical scrub  Monitoring:blood pressure monitoring and continuous pulse oximetry  Epidural Block Procedure:  Approach:midline  Guidance:landmark technique and palpation technique  Location:L3-L4  Needle Type:Tuohy  Needle Gauge:17 G  Loss of Resistance Medium: air  Loss of Resistance: 5cm  Cath Depth at skin:9 cm  Paresthesia: none  Aspiration:negative  Test Dose:negative  Medication: lidocaine 1.5%-EPINEPHrine 1:200,000 (XYLOCAINE W/EPI) injection - Epidural   2 mL - 3/3/2025 5:32:00 PM  ropivacaine (NAROPIN) 0.2 % injection - Injection   7 mL - 3/3/2025 5:35:00 PM  Number of Attempts: 1  Post Assessment:  Dressing:biopatch applied, occlusive dressing applied and secured with tape  Pt Tolerance:patient tolerated the procedure well with no apparent complications  Complications:no             no

## 2025-06-18 NOTE — ASU PREOP CHECKLIST - 2.
EXTENDED EEG Completed with [facility]    PCP: Cara Norman, ROMAN - CNP    Ordering: Jose E Bustillos Neurologist    Interpreting Physician: Apollo Fairbanks Neurologist    Technician: Fawn Schroeder RN    
none

## 2025-07-28 ENCOUNTER — NON-APPOINTMENT (OUTPATIENT)
Age: 50
End: 2025-07-28

## 2025-07-28 ENCOUNTER — APPOINTMENT (OUTPATIENT)
Dept: OBGYN | Facility: CLINIC | Age: 50
End: 2025-07-28
Payer: COMMERCIAL

## 2025-07-28 VITALS
BODY MASS INDEX: 22.38 KG/M2 | WEIGHT: 111 LBS | HEIGHT: 59 IN | DIASTOLIC BLOOD PRESSURE: 75 MMHG | SYSTOLIC BLOOD PRESSURE: 108 MMHG

## 2025-07-28 DIAGNOSIS — D25.9 LEIOMYOMA OF UTERUS, UNSPECIFIED: ICD-10-CM

## 2025-07-28 DIAGNOSIS — Z01.411 ENCOUNTER FOR GYNECOLOGICAL EXAMINATION (GENERAL) (ROUTINE) WITH ABNORMAL FINDINGS: ICD-10-CM

## 2025-07-28 DIAGNOSIS — Z11.3 ENCOUNTER FOR SCREENING FOR INFECTIONS WITH A PREDOMINANTLY SEXUAL MODE OF TRANSMISSION: ICD-10-CM

## 2025-07-28 PROCEDURE — G0444 DEPRESSION SCREEN ANNUAL: CPT

## 2025-07-28 PROCEDURE — 99396 PREV VISIT EST AGE 40-64: CPT

## 2025-07-28 PROCEDURE — 99459 PELVIC EXAMINATION: CPT

## 2025-08-11 ENCOUNTER — APPOINTMENT (OUTPATIENT)
Dept: ULTRASOUND IMAGING | Facility: CLINIC | Age: 50
End: 2025-08-11
Payer: COMMERCIAL

## 2025-08-11 PROCEDURE — 76830 TRANSVAGINAL US NON-OB: CPT

## 2025-08-11 PROCEDURE — 76856 US EXAM PELVIC COMPLETE: CPT

## 2025-08-18 ENCOUNTER — NON-APPOINTMENT (OUTPATIENT)
Age: 50
End: 2025-08-18

## (undated) DEVICE — BASIN SET DOUBLE

## (undated) DEVICE — ELCTR E/S NEEDLE 0.75"

## (undated) DEVICE — MEDICINE CUP WITH LID 60ML

## (undated) DEVICE — ELCTR BOVIE TIP BLADE INSULATED 2.75" EDGE

## (undated) DEVICE — DRAPE COVER SNAP 36X30"

## (undated) DEVICE — GAMMA SLEEVE DISPOSABLE

## (undated) DEVICE — GLV 7.5 PROTEXIS (CREAM) MICRO

## (undated) DEVICE — DRSG TEGADERM 4X4.75"

## (undated) DEVICE — SUT SILK 0 18" TIES

## (undated) DEVICE — CANISTER DISPOSABLE THIN WALL 3000CC

## (undated) DEVICE — PACK MINOR WITH LAP

## (undated) DEVICE — SUT MONOCRYL 4-0 27" PS-2 UNDYED

## (undated) DEVICE — SUT SILK 2-0 18" FS

## (undated) DEVICE — SYR LUER LOK 20CC

## (undated) DEVICE — DRSG BENZOIN 0.6CC

## (undated) DEVICE — SUT SILK 2-0 18" TIES

## (undated) DEVICE — DRAPE 3/4 SHEET 52X76"

## (undated) DEVICE — DRSG COMBINE 5X9"

## (undated) DEVICE — GOWN LG

## (undated) DEVICE — SUT SILK 2-0 30" TIES

## (undated) DEVICE — DRSG XEROFORM 5 X 9"

## (undated) DEVICE — PROTECTOR HEEL / ELBOW FLUFFY

## (undated) DEVICE — ELCTR BOVIE TIP BLADE INSULATED 6.5" EDGE

## (undated) DEVICE — DRAPE MINOR PROCEDURE

## (undated) DEVICE — BLADE SURGICAL #11 CARBON

## (undated) DEVICE — VENODYNE/SCD SLEEVE CALF MEDIUM

## (undated) DEVICE — SUT VICRYL 3-0 18" SH (POP-OFF)

## (undated) DEVICE — WARMING BLANKET FULL UNDERBODY

## (undated) DEVICE — Device

## (undated) DEVICE — DRAPE LAPAROTOMY TRANSVERSE

## (undated) DEVICE — DRAIN RESERVOIR FOR JACKSON PRATT 100CC CARDINAL

## (undated) DEVICE — DRSG STERISTRIPS 0.5 X 4"

## (undated) DEVICE — SAFETY PIN

## (undated) DEVICE — POSITIONER STRAP ARMBOARD VELCRO TS-30

## (undated) DEVICE — SUT SILK 2-0 30" SH

## (undated) DEVICE — STAPLER SKIN VISI-STAT 35 WIDE

## (undated) DEVICE — ELCTR BOVIE PENCIL SMOKE EVACUATION

## (undated) DEVICE — SUT CHROMIC 3-0 27" SH

## (undated) DEVICE — SOL IRR POUR H2O 500ML

## (undated) DEVICE — DRAIN JACKSON PRATT 10MM FLAT FULL 15FR TROCAR

## (undated) DEVICE — SOL IRR POUR H2O 1500ML

## (undated) DEVICE — SUT SILK 3-0 30" SH (POP-OFF)

## (undated) DEVICE — SUT VICRYL 3-0 27" SH UNDYED

## (undated) DEVICE — ELCTR GROUNDING PAD ADULT COVIDIEN

## (undated) DEVICE — PACK MAJOR ABDOMINAL WITH LAP

## (undated) DEVICE — DRAIN JACKSON PRATT 19FR ROUND END NO TROCAR

## (undated) DEVICE — DRSG MASTISOL

## (undated) DEVICE — DRSG CURITY GAUZE SPONGE 4 X 4" 12-PLY